# Patient Record
Sex: FEMALE | Race: WHITE | NOT HISPANIC OR LATINO | URBAN - METROPOLITAN AREA
[De-identification: names, ages, dates, MRNs, and addresses within clinical notes are randomized per-mention and may not be internally consistent; named-entity substitution may affect disease eponyms.]

---

## 2017-02-15 ENCOUNTER — INPATIENT (INPATIENT)
Facility: HOSPITAL | Age: 77
LOS: 4 days | Discharge: NURSING, REHAB OR LONG-TERM CARE | DRG: 871 | End: 2017-02-20
Attending: FAMILY MEDICINE | Admitting: INTERNAL MEDICINE
Payer: MEDICARE

## 2017-02-15 VITALS
OXYGEN SATURATION: 85 % | SYSTOLIC BLOOD PRESSURE: 82 MMHG | TEMPERATURE: 102 F | RESPIRATION RATE: 20 BRPM | HEART RATE: 91 BPM | DIASTOLIC BLOOD PRESSURE: 60 MMHG

## 2017-02-15 DIAGNOSIS — Z95.1 PRESENCE OF AORTOCORONARY BYPASS GRAFT: Chronic | ICD-10-CM

## 2017-02-15 DIAGNOSIS — A41.9 SEPSIS, UNSPECIFIED ORGANISM: ICD-10-CM

## 2017-02-15 DIAGNOSIS — Z95.2 PRESENCE OF PROSTHETIC HEART VALVE: Chronic | ICD-10-CM

## 2017-02-15 DIAGNOSIS — Z98.890 OTHER SPECIFIED POSTPROCEDURAL STATES: Chronic | ICD-10-CM

## 2017-02-15 LAB
ALBUMIN SERPL ELPH-MCNC: 2.6 G/DL — LOW (ref 3.3–5.2)
ALP SERPL-CCNC: 83 U/L — SIGNIFICANT CHANGE UP (ref 40–120)
ALT FLD-CCNC: 22 U/L — SIGNIFICANT CHANGE UP
ANION GAP SERPL CALC-SCNC: 11 MMOL/L — SIGNIFICANT CHANGE UP (ref 5–17)
ANION GAP SERPL CALC-SCNC: 9 MMOL/L — SIGNIFICANT CHANGE UP (ref 5–17)
ANISOCYTOSIS BLD QL: SLIGHT — SIGNIFICANT CHANGE UP
APPEARANCE UR: ABNORMAL
APTT BLD: 32.8 SEC — SIGNIFICANT CHANGE UP (ref 27.5–37.4)
AST SERPL-CCNC: 30 U/L — SIGNIFICANT CHANGE UP
BASOPHILS # BLD AUTO: 0 K/UL — SIGNIFICANT CHANGE UP (ref 0–0.2)
BASOPHILS NFR BLD AUTO: 0.1 % — SIGNIFICANT CHANGE UP (ref 0–2)
BILIRUB SERPL-MCNC: 0.3 MG/DL — LOW (ref 0.4–2)
BILIRUB UR-MCNC: NEGATIVE — SIGNIFICANT CHANGE UP
BUN SERPL-MCNC: 19 MG/DL — SIGNIFICANT CHANGE UP (ref 8–20)
BUN SERPL-MCNC: 19 MG/DL — SIGNIFICANT CHANGE UP (ref 8–20)
CALCIUM SERPL-MCNC: 8.3 MG/DL — LOW (ref 8.6–10.2)
CALCIUM SERPL-MCNC: 9 MG/DL — SIGNIFICANT CHANGE UP (ref 8.6–10.2)
CHLORIDE SERPL-SCNC: 90 MMOL/L — LOW (ref 98–107)
CHLORIDE SERPL-SCNC: 94 MMOL/L — LOW (ref 98–107)
CO2 SERPL-SCNC: 24 MMOL/L — SIGNIFICANT CHANGE UP (ref 22–29)
CO2 SERPL-SCNC: 28 MMOL/L — SIGNIFICANT CHANGE UP (ref 22–29)
COLOR SPEC: YELLOW — SIGNIFICANT CHANGE UP
CREAT SERPL-MCNC: 0.84 MG/DL — SIGNIFICANT CHANGE UP (ref 0.5–1.3)
CREAT SERPL-MCNC: 0.92 MG/DL — SIGNIFICANT CHANGE UP (ref 0.5–1.3)
DIFF PNL FLD: ABNORMAL
DIGOXIN SERPL-MCNC: 0.8 NG/ML — SIGNIFICANT CHANGE UP (ref 0.8–2)
EOSINOPHIL # BLD AUTO: 0 K/UL — SIGNIFICANT CHANGE UP (ref 0–0.5)
EOSINOPHIL NFR BLD AUTO: 0.1 % — SIGNIFICANT CHANGE UP (ref 0–6)
GLUCOSE SERPL-MCNC: 109 MG/DL — SIGNIFICANT CHANGE UP (ref 70–115)
GLUCOSE SERPL-MCNC: 90 MG/DL — SIGNIFICANT CHANGE UP (ref 70–115)
GLUCOSE UR QL: NEGATIVE MG/DL — SIGNIFICANT CHANGE UP
HCT VFR BLD CALC: 26.5 % — LOW (ref 37–47)
HGB BLD-MCNC: 8.1 G/DL — LOW (ref 12–16)
HYPOCHROMIA BLD QL: SLIGHT — SIGNIFICANT CHANGE UP
INR BLD: 1.34 RATIO — HIGH (ref 0.88–1.16)
KETONES UR-MCNC: ABNORMAL
LACTATE BLDV-MCNC: 1.1 MMOL/L — SIGNIFICANT CHANGE UP (ref 0.7–2)
LEUKOCYTE ESTERASE UR-ACNC: ABNORMAL
LYMPHOCYTES # BLD AUTO: 0.5 K/UL — LOW (ref 1–4.8)
LYMPHOCYTES # BLD AUTO: 3 % — LOW (ref 20–55)
MACROCYTES BLD QL: SLIGHT — SIGNIFICANT CHANGE UP
MCHC RBC-ENTMCNC: 28 PG — SIGNIFICANT CHANGE UP (ref 27–31)
MCHC RBC-ENTMCNC: 30.6 G/DL — LOW (ref 32–36)
MCV RBC AUTO: 91.7 FL — SIGNIFICANT CHANGE UP (ref 81–99)
MICROCYTES BLD QL: SLIGHT — SIGNIFICANT CHANGE UP
MONOCYTES # BLD AUTO: 0.8 K/UL — SIGNIFICANT CHANGE UP (ref 0–0.8)
MONOCYTES NFR BLD AUTO: 5 % — SIGNIFICANT CHANGE UP (ref 3–10)
NEUTROPHILS # BLD AUTO: 15.1 K/UL — HIGH (ref 1.8–8)
NEUTROPHILS NFR BLD AUTO: 92 % — HIGH (ref 37–73)
NITRITE UR-MCNC: POSITIVE
NT-PROBNP SERPL-SCNC: 5465 PG/ML — HIGH (ref 0–300)
PH UR: 5 — SIGNIFICANT CHANGE UP (ref 4.8–8)
PLAT MORPH BLD: NORMAL — SIGNIFICANT CHANGE UP
PLATELET # BLD AUTO: 357 K/UL — SIGNIFICANT CHANGE UP (ref 150–400)
POIKILOCYTOSIS BLD QL AUTO: SLIGHT — SIGNIFICANT CHANGE UP
POTASSIUM SERPL-MCNC: 2.9 MMOL/L — CRITICAL LOW (ref 3.5–5.3)
POTASSIUM SERPL-MCNC: 5.3 MMOL/L — SIGNIFICANT CHANGE UP (ref 3.5–5.3)
POTASSIUM SERPL-MCNC: 7 MMOL/L — CRITICAL HIGH (ref 3.5–5.3)
POTASSIUM SERPL-SCNC: 2.9 MMOL/L — CRITICAL LOW (ref 3.5–5.3)
POTASSIUM SERPL-SCNC: 5.3 MMOL/L — SIGNIFICANT CHANGE UP (ref 3.5–5.3)
POTASSIUM SERPL-SCNC: 7 MMOL/L — CRITICAL HIGH (ref 3.5–5.3)
PROT SERPL-MCNC: 7.9 G/DL — SIGNIFICANT CHANGE UP (ref 6.6–8.7)
PROT UR-MCNC: 30 MG/DL
PROTHROM AB SERPL-ACNC: 14.8 SEC — HIGH (ref 10–13.1)
RBC # BLD: 2.89 M/UL — LOW (ref 4.4–5.2)
RBC # FLD: 16.4 % — HIGH (ref 11–15.6)
RBC BLD AUTO: ABNORMAL
SODIUM SERPL-SCNC: 127 MMOL/L — LOW (ref 135–145)
SODIUM SERPL-SCNC: 129 MMOL/L — LOW (ref 135–145)
SP GR SPEC: 1.02 — SIGNIFICANT CHANGE UP (ref 1.01–1.02)
UROBILINOGEN FLD QL: 1 MG/DL
WBC # BLD: 16.4 K/UL — HIGH (ref 4.8–10.8)
WBC # FLD AUTO: 16.4 K/UL — HIGH (ref 4.8–10.8)

## 2017-02-15 PROCEDURE — 71010: CPT | Mod: 26

## 2017-02-15 PROCEDURE — 71250 CT THORAX DX C-: CPT | Mod: 26

## 2017-02-15 PROCEDURE — 99291 CRITICAL CARE FIRST HOUR: CPT

## 2017-02-15 PROCEDURE — 99223 1ST HOSP IP/OBS HIGH 75: CPT

## 2017-02-15 PROCEDURE — 99285 EMERGENCY DEPT VISIT HI MDM: CPT

## 2017-02-15 PROCEDURE — 71275 CT ANGIOGRAPHY CHEST: CPT | Mod: 26,59

## 2017-02-15 PROCEDURE — 99232 SBSQ HOSP IP/OBS MODERATE 35: CPT

## 2017-02-15 RX ORDER — LEVOTHYROXINE SODIUM 125 MCG
112 TABLET ORAL DAILY
Qty: 0 | Refills: 0 | Status: DISCONTINUED | OUTPATIENT
Start: 2017-02-15 | End: 2017-02-20

## 2017-02-15 RX ORDER — MIRTAZAPINE 45 MG/1
7.5 TABLET, ORALLY DISINTEGRATING ORAL AT BEDTIME
Qty: 0 | Refills: 0 | Status: DISCONTINUED | OUTPATIENT
Start: 2017-02-15 | End: 2017-02-16

## 2017-02-15 RX ORDER — ASPIRIN/CALCIUM CARB/MAGNESIUM 324 MG
81 TABLET ORAL DAILY
Qty: 0 | Refills: 0 | Status: DISCONTINUED | OUTPATIENT
Start: 2017-02-15 | End: 2017-02-15

## 2017-02-15 RX ORDER — POTASSIUM CHLORIDE 20 MEQ
10 PACKET (EA) ORAL
Qty: 0 | Refills: 0 | Status: COMPLETED | OUTPATIENT
Start: 2017-02-15 | End: 2017-02-15

## 2017-02-15 RX ORDER — BUDESONIDE AND FORMOTEROL FUMARATE DIHYDRATE 160; 4.5 UG/1; UG/1
2 AEROSOL RESPIRATORY (INHALATION)
Qty: 0 | Refills: 0 | Status: DISCONTINUED | OUTPATIENT
Start: 2017-02-15 | End: 2017-02-20

## 2017-02-15 RX ORDER — SODIUM CHLORIDE 9 MG/ML
1000 INJECTION INTRAMUSCULAR; INTRAVENOUS; SUBCUTANEOUS
Qty: 0 | Refills: 0 | Status: DISCONTINUED | OUTPATIENT
Start: 2017-02-15 | End: 2017-02-18

## 2017-02-15 RX ORDER — ENOXAPARIN SODIUM 100 MG/ML
40 INJECTION SUBCUTANEOUS DAILY
Qty: 0 | Refills: 0 | Status: DISCONTINUED | OUTPATIENT
Start: 2017-02-15 | End: 2017-02-15

## 2017-02-15 RX ORDER — DIGOXIN 250 MCG
0.12 TABLET ORAL DAILY
Qty: 0 | Refills: 0 | Status: DISCONTINUED | OUTPATIENT
Start: 2017-02-15 | End: 2017-02-20

## 2017-02-15 RX ORDER — ACETAMINOPHEN 500 MG
650 TABLET ORAL EVERY 6 HOURS
Qty: 0 | Refills: 0 | Status: DISCONTINUED | OUTPATIENT
Start: 2017-02-15 | End: 2017-02-20

## 2017-02-15 RX ORDER — SODIUM CHLORIDE 9 MG/ML
2000 INJECTION INTRAMUSCULAR; INTRAVENOUS; SUBCUTANEOUS ONCE
Qty: 0 | Refills: 0 | Status: COMPLETED | OUTPATIENT
Start: 2017-02-15 | End: 2017-02-15

## 2017-02-15 RX ORDER — ESMOLOL HCL 100MG/10ML
35 VIAL (ML) INTRAVENOUS ONCE
Qty: 0 | Refills: 0 | Status: DISCONTINUED | OUTPATIENT
Start: 2017-02-15 | End: 2017-02-15

## 2017-02-15 RX ORDER — ATORVASTATIN CALCIUM 80 MG/1
10 TABLET, FILM COATED ORAL AT BEDTIME
Qty: 0 | Refills: 0 | Status: DISCONTINUED | OUTPATIENT
Start: 2017-02-15 | End: 2017-02-20

## 2017-02-15 RX ORDER — ACETAMINOPHEN 500 MG
650 TABLET ORAL ONCE
Qty: 0 | Refills: 0 | Status: COMPLETED | OUTPATIENT
Start: 2017-02-15 | End: 2017-02-15

## 2017-02-15 RX ORDER — ACETAMINOPHEN 500 MG
650 TABLET ORAL ONCE
Qty: 0 | Refills: 0 | Status: DISCONTINUED | OUTPATIENT
Start: 2017-02-15 | End: 2017-02-15

## 2017-02-15 RX ORDER — GABAPENTIN 400 MG/1
100 CAPSULE ORAL
Qty: 0 | Refills: 0 | Status: DISCONTINUED | OUTPATIENT
Start: 2017-02-15 | End: 2017-02-20

## 2017-02-15 RX ORDER — ERTAPENEM SODIUM 1 G/1
1000 INJECTION, POWDER, LYOPHILIZED, FOR SOLUTION INTRAMUSCULAR; INTRAVENOUS EVERY 24 HOURS
Qty: 0 | Refills: 0 | Status: DISCONTINUED | OUTPATIENT
Start: 2017-02-15 | End: 2017-02-18

## 2017-02-15 RX ORDER — HYDROMORPHONE HYDROCHLORIDE 2 MG/ML
2 INJECTION INTRAMUSCULAR; INTRAVENOUS; SUBCUTANEOUS EVERY 6 HOURS
Qty: 0 | Refills: 0 | Status: DISCONTINUED | OUTPATIENT
Start: 2017-02-15 | End: 2017-02-16

## 2017-02-15 RX ORDER — CARVEDILOL PHOSPHATE 80 MG/1
3.12 CAPSULE, EXTENDED RELEASE ORAL EVERY 12 HOURS
Qty: 0 | Refills: 0 | Status: DISCONTINUED | OUTPATIENT
Start: 2017-02-15 | End: 2017-02-17

## 2017-02-15 RX ORDER — MEROPENEM 1 G/30ML
1000 INJECTION INTRAVENOUS ONCE
Qty: 0 | Refills: 0 | Status: COMPLETED | OUTPATIENT
Start: 2017-02-15 | End: 2017-02-15

## 2017-02-15 RX ORDER — POTASSIUM CHLORIDE 20 MEQ
40 PACKET (EA) ORAL ONCE
Qty: 0 | Refills: 0 | Status: COMPLETED | OUTPATIENT
Start: 2017-02-15 | End: 2017-02-15

## 2017-02-15 RX ADMIN — MEROPENEM 200 MILLIGRAM(S): 1 INJECTION INTRAVENOUS at 07:44

## 2017-02-15 RX ADMIN — ERTAPENEM SODIUM 120 MILLIGRAM(S): 1 INJECTION, POWDER, LYOPHILIZED, FOR SOLUTION INTRAMUSCULAR; INTRAVENOUS at 23:43

## 2017-02-15 RX ADMIN — HYDROMORPHONE HYDROCHLORIDE 2 MILLIGRAM(S): 2 INJECTION INTRAMUSCULAR; INTRAVENOUS; SUBCUTANEOUS at 23:40

## 2017-02-15 RX ADMIN — Medication 100 MILLIEQUIVALENT(S): at 12:11

## 2017-02-15 RX ADMIN — Medication 100 MILLIEQUIVALENT(S): at 14:12

## 2017-02-15 RX ADMIN — SODIUM CHLORIDE 2000 MILLILITER(S): 9 INJECTION INTRAMUSCULAR; INTRAVENOUS; SUBCUTANEOUS at 08:15

## 2017-02-15 RX ADMIN — Medication 100 MILLIEQUIVALENT(S): at 13:05

## 2017-02-15 RX ADMIN — Medication 40 MILLIEQUIVALENT(S): at 13:04

## 2017-02-15 RX ADMIN — CARVEDILOL PHOSPHATE 3.12 MILLIGRAM(S): 80 CAPSULE, EXTENDED RELEASE ORAL at 18:36

## 2017-02-15 RX ADMIN — GABAPENTIN 100 MILLIGRAM(S): 400 CAPSULE ORAL at 19:03

## 2017-02-15 RX ADMIN — SODIUM CHLORIDE 75 MILLILITER(S): 9 INJECTION INTRAMUSCULAR; INTRAVENOUS; SUBCUTANEOUS at 23:05

## 2017-02-15 RX ADMIN — SODIUM CHLORIDE 75 MILLILITER(S): 9 INJECTION INTRAMUSCULAR; INTRAVENOUS; SUBCUTANEOUS at 15:57

## 2017-02-15 RX ADMIN — MIRTAZAPINE 7.5 MILLIGRAM(S): 45 TABLET, ORALLY DISINTEGRATING ORAL at 23:05

## 2017-02-15 RX ADMIN — ATORVASTATIN CALCIUM 10 MILLIGRAM(S): 80 TABLET, FILM COATED ORAL at 23:05

## 2017-02-15 RX ADMIN — Medication 650 MILLIGRAM(S): at 07:04

## 2017-02-15 RX ADMIN — Medication 650 MILLIGRAM(S): at 08:06

## 2017-02-15 NOTE — CONSULT NOTE ADULT - SUBJECTIVE AND OBJECTIVE BOX
History of Present Illness:  76y Female reportedly found down and now admit with urosepsis?  CXR found to have incidental small Rt PTX.  Comfortable from a resp standpoint.    Past Medical History  Hypothyroidism, unspecified type  Coronary artery disease involving native coronary artery of native heart without angina pectoris  CHF (congestive heart failure)  Hypotension      Past Surgical History  S/P AVR  S/P CABG x 1  S/P AAA repair        MEDICATIONS  (STANDING):  sodium chloride 0.9%. 1000milliLiter(s) IV Continuous <Continuous>  enoxaparin Injectable 40milliGRAM(s) SubCutaneous daily  aspirin enteric coated 81milliGRAM(s) Oral daily  digoxin     Tablet 0.125milliGRAM(s) Oral daily  gabapentin 100milliGRAM(s) Oral two times a day  mirtazapine 7.5milliGRAM(s) Oral at bedtime  atorvastatin 10milliGRAM(s) Oral at bedtime  carvedilol 3.125milliGRAM(s) Oral every 12 hours  buDESOnide 160 MICROgram(s)/formoterol 4.5 MICROgram(s) Inhaler 2Puff(s) Inhalation two times a day  levothyroxine 112MICROGram(s) Oral daily  ertapenem  IVPB 1000milliGRAM(s) IV Intermittent every 24 hours    MEDICATIONS  (PRN):  acetaminophen   Tablet. 650milliGRAM(s) Oral every 6 hours PRN Moderate Pain (4 - 6)  HYDROmorphone   Tablet 2milliGRAM(s) Oral every 6 hours PRN Severe Pain (7 - 10)    Antiplatelet therapy:                           Last dose/amt:    Allergies: Codeine Phosphate (Other)  ferrous sulfate (Other)  penicillin (Other)      SOCIAL HISTORY:  Smoker: [ ] Yes  [x ] No        PACK YEARS:                         WHEN QUIT?  ETOH use: [ ] Yes  [x ] No              FREQUENCY / QUANTITY:  Ilicit Drug use:  [ ] Yes  [x ] No  Occupation:  Live with:  Assist device use:    Relevant Family History  FAMILY HISTORY:  No pertinent family history in first degree relatives      Review of Systems negative except for HPI.  Pt somewhat confused and history difficult  GENERAL:  Fevers[] chills[] sweats[] fatigue[] weight loss[] weight gain []                                        NEURO:  parathesias[] seizures []  syncope []  confusion []                                                                                  EYES: glasses[]  blurry vision[]  discharge[] pain[] glaucoma []                                                                            ENMT:  difficulty hearing []  vertigo[]  dysphagia[] epistaxis[] recent dental work []                                      CV:  chest pain[] palpitations[] DOLNA [] diaphoresis [] edema[]                                                                                             RESPIRATORY:  wheezing[] SOB[] cough [] sputum[] hemoptysis[]                                                                    GI:  nausea[]  vomiting []  diarrhea[] constipation [] melena []                                                                        : hematuria[ ]  dysuria[ ] urgency[] incontinence[]                                                                                              MUSKULOSKELETAL:  arthritis[ ]  joint swelling [ ] muscle weakness [ ]                                                                  SKIN/BREAST:  rash[ ] itching [ ]  hair loss[ ] masses[ ]                                                                                                PSYCH:  dementia [ ] depresion [ ] anxiety[ ]                                                                                                                  HEME/LYMPH:  bruises easily[ ] enlarged lymph nodes[ ] tender lymph nodes[ ]                                                 ENDOCRINE:  cold intolerance[ ] heat intolerance[ ] polydipsia[ ]                                                                              PHYSICAL EXAM  Vital Signs Last 24 Hrs  T(C): 36.4, Max: 38.9 (02-15 @ 06:39)  T(F): 97.6, Max: 102 (02-15 @ 06:39)  HR: 81 (81 - 101)  BP: 108/53 (82/60 - 108/53)  BP(mean): --  RR: 19 (19 - 22)  SpO2: 94% (85% - 100%)    General: Well nourished, well developed, no acute distress.                                                         Neuro: Normal exam oriented to person/place & time with no focal motor or sensory  deficits.                    Eyes: Normal exam of conjunctiva & lids, pupils equally reactive.   ENT: Normal exam of nasal/oral mucosa with absence of cyanosis.   Neck: Normal exam of jugular veins, trachea & thyroid.   Chest: Normal lung exam with good air movement absence of wheezes, rales, or rhonchi:                                                                          CV:  Auscultation: normal [x ] S3[ ] S4[ ] Irregular [ ] Rub[ ] Clicks[ ]  Murmurs none:[x ]systolic [ ]  diastolic [ ] holosystolic [ ]  Carotids: No Bruits[ ] Other____________ Abdominal Aorta: normal [ ] nonpalpable[ ]                                                                         GI: Normal exam of abdomen, liver & spleen with no noted masses or tenderness.                                                                                              Extremities: Normal no evidence of cyanosis or deformity Edema: none[ ]trace[ ]1+[ ]2+[ ]3+[ ]4+[ ]  Lower Extremity Pulses: Right[ ] Left[ ]Varicosities[ ]  SKIN : Normal exam to inspection & palation.                                                           LABS:                        8.1    16.4  )-----------( 357      ( 15 Feb 2017 07:18 )             26.5     15 Feb 2017 10:52    x      |  x      |  x      ----------------------------<  x      2.9     |  x      |  x        Ca    9.0        15 Feb 2017 07:18    TPro  7.9    /  Alb  2.6    /  TBili  0.3    /  DBili  x      /  AST  30     /  ALT  22     /  AlkPhos  83     15 Feb 2017 07:18    PT/INR - ( 15 Feb 2017 07:18 )   PT: 14.8 sec;   INR: 1.34 ratio         PTT - ( 15 Feb 2017 07:18 )  PTT:32.8 sec  Urinalysis Basic - ( 15 Feb 2017 10:15 )    Color: Yellow / Appearance: Slightly Turbid / S.020 / pH: x  Gluc: x / Ketone: Trace  / Bili: Negative / Urobili: 1 mg/dL   Blood: x / Protein: 30 mg/dL / Nitrite: Positive   Leuk Esterase: Moderate / RBC: 0-2 /HPF / WBC 26-50   Sq Epi: x / Non Sq Epi: x / Bacteria: Moderate      CARDIAC MARKERS ( 15 Feb 2017 07:18 )  x     / <0.01 ng/mL / x     / x     / x              CXR: small Rt PTX  CT Chest: p        Assessment:  76y Female presents with Sepsis, due to unspecified organism and incidentally found to have Rt PTX.  CT pending but comfortable from resp standpoint.  Suspect observation appropriate but will await CT.      Plan:

## 2017-02-15 NOTE — ED PROVIDER NOTE - PROGRESS NOTE DETAILS
Dr. Ellis was called and made aware. CT surgery called and made aware.  Multiple attempts made to reach Dr. Loyola including over head page - no response.

## 2017-02-15 NOTE — H&P ADULT. - ASSESSMENT
76 y F hx HTN, CAD s/p CABG, AAA s/p repair, s/p bioprosth AVR, pleural effusion s/p drainage, hypothyroidism adm w lethargy, sepsis, PNA/UTI.     Sepsis: PNA/UTI- sepsis protocol, IV abx, f/u cultures. BP improved, cont to monitor  r/o pneumothorax: thoracic surgery eval, f/u ct chest  CAD: cont cardiac meds as tolerated  hypothyroidism: cont synthroid  CHF: cont home meds, hold lasix for now. check dig level    Prophyl: lovenox 76 y F hx HTN, CAD s/p CABG, AAA s/p repair, s/p bioprosth AVR, pleural effusion s/p drainage, hypothyroidism adm w lethargy, sepsis, PNA/UTI.     Sepsis: PNA/UTI- sepsis protocol, IV abx, f/u cultures. BP improved, cont to monitor  r/o pneumothorax: thoracic surgery eval, f/u ct chest  CAD: cont cardiac meds as tolerated  hypothyroidism: cont synthroid  CHF: cont home meds, hold lasix for now. check dig level  Sacral decub: wound care consult, pain control    Prophyl: lovenox 76 y F hx HTN, CAD s/p CABG, AAA s/p repair, s/p bioprosth AVR, pleural effusion s/p drainage, hypothyroidism adm w lethargy, sepsis, PNA/UTI.     Sepsis: PNA/UTI- sepsis protocol, IV abx, f/u cultures. BP improved, cont to monitor  r/o pneumothorax: thoracic surgery eval, f/u ct chest  hypokalemia: repleted, f/u labs  CAD: cont cardiac meds as tolerated  hypothyroidism: cont synthroid  CHF: cont home meds, hold lasix for now. check dig level  Sacral decub: wound care consult, pain control    Prophyl: lovenox

## 2017-02-15 NOTE — ED PROVIDER NOTE - CARE PLAN
Principal Discharge DX:	Sepsis, due to unspecified organism  Secondary Diagnosis:	Hypokalemia  Secondary Diagnosis:	Hypoxia

## 2017-02-15 NOTE — ED ADULT TRIAGE NOTE - CHIEF COMPLAINT QUOTE
pt dx with pneumonia [facility] hypotensive upon arrival ems reports pt saturating 89% on nc  at NH  and open eyes to verbal stimuli  not speaking , pt arrives wet cough rales lower lobes as per ems. pt alert and oriented to self hot to touch pt dx with pneumonia from [facility]  of [facility] hypotensive upon arrival ems reports pt saturating 89% on nc  at NH  and open eyes to verbal stimuli  not speaking , pt arrives wet cough rales lower lobes as per ems. pt alert and oriented to self hot to touch, md called to bedside

## 2017-02-15 NOTE — CONSULT NOTE ADULT - ASSESSMENT
75 y/o female with multiple comorbidities, consult placed for possible aortic dissection type B  - Recommend CT angio chest abdomen pelvis  - Vascular surgery will follow

## 2017-02-15 NOTE — H&P ADULT. - RADIOLOGY RESULTS AND INTERPRETATION
CXR visualized: Impression:  Cardiomegaly. Tortuous aorta. Cannot exclude aneurysm. Small right   pneumothorax.    These critical values were discussed by Dr. Dada Altamirano with Dr. Lashon Sena on 2/15/2017 2:27 PM with read back..

## 2017-02-15 NOTE — ED ADULT NURSE NOTE - CHIEF COMPLAINT QUOTE
pt dx with pneumonia from [facility]  [facility] hypotensive upon arrival ems reports pt saturating 89% on nc  at NH  and open eyes to verbal stimuli  not speaking , pt arrives wet cough rales lower lobes as per ems. pt alert and oriented to self hot to touch, md called to bedside

## 2017-02-15 NOTE — ED PROVIDER NOTE - OBJECTIVE STATEMENT
This patient is a 76 year old woman who presents to the ED from [facility] after being found slow to respond, low pulse ox and with bilateral congestion.  Patient orders including Abx done prior to my evaluation by previous ER doctor.  Patient states that she has a wet cough that she has been unable to expectorate.  She also reports that she is currently being treated for PNA.  Patient occasionally has SOB.  She denies chest pain, abdominal pain, vomiting, and leg swelling.

## 2017-02-15 NOTE — H&P ADULT. - HISTORY OF PRESENT ILLNESS
76 y F hx COPD, CHF, hypothyroidism, sacral decub, AAA s/p repair, CAD s/p 1V CABG, bioprosthetic AVR Dec 2016, subsequently developed pleural effusion requiring drainage, transferred to [facility] wks ago, now presents following episode of unresponsiveness, lethargy. recently started on oral antibiotics for PNA. +congested cough, SOB. Denies F/C, CP, N/V/D, dysuria, abd pain, LE edema. Fair appetite. c/o pain from decub which is longstanding.

## 2017-02-15 NOTE — ED ADULT NURSE NOTE - OBJECTIVE STATEMENT
patient arrived via Ambulance SOB, on O2, rales and diminished BS noted, from NH, noted desating to 80s, SOB, + cough no prodution

## 2017-02-15 NOTE — CONSULT NOTE ADULT - SUBJECTIVE AND OBJECTIVE BOX
75 y/o female with multiple medical comorbidities, recently discharged from [facility] after cardiac procedure in december and aneurysm repair in july. Admitted to medicine with diagnosis of sepsis. Vascular surgery consulted for concern of aortic dissection. Pt states her main complaint right now is that she is having a hard time breathing. Admits to dry, non-productive cough and fever. States she has been having chest and back pain that has come and gone over the past few days. R arm BP = 158/70. L arm /76. Lower extremities non edematous with 2+ DP pulses. 77 y/o female with multiple medical comorbidities, recently discharged from [facility] after cardiac procedure in december and aneurysm repair in july. Admitted to medicine with diagnosis of sepsis. Vascular surgery consulted for concern of aortic dissection. Pt states her main complaint right now is that she is having a hard time breathing. Admits to dry, non-productive cough and fever. States she has been having chest and back pain that has come and gone over the past few days. States chest and back pain are worse when she coughs, denies alleviating factors. Admits to pain "deep" in chest as well as around old sternotomy incision.       MEDICATIONS  (STANDING):  sodium chloride 0.9%. 1000milliLiter(s) IV Continuous <Continuous>  digoxin     Tablet 0.125milliGRAM(s) Oral daily  gabapentin 100milliGRAM(s) Oral two times a day  mirtazapine 7.5milliGRAM(s) Oral at bedtime  atorvastatin 10milliGRAM(s) Oral at bedtime  carvedilol 3.125milliGRAM(s) Oral every 12 hours  buDESOnide 160 MICROgram(s)/formoterol 4.5 MICROgram(s) Inhaler 2Puff(s) Inhalation two times a day  levothyroxine 112MICROGram(s) Oral daily  ertapenem  IVPB 1000milliGRAM(s) IV Intermittent every 24 hours    MEDICATIONS  (PRN):  acetaminophen   Tablet. 650milliGRAM(s) Oral every 6 hours PRN Moderate Pain (4 - 6)  HYDROmorphone   Tablet 2milliGRAM(s) Oral every 6 hours PRN Severe Pain (7 - 10)      Vital Signs Last 24 Hrs  T(C): 36.4, Max: 38.9 (02-15 @ 06:39)  T(F): 97.6, Max: 102 (02-15 @ 06:39)  HR: 81 (81 - 101)  BP: 108/53 (82/60 - 108/53)  RR: 19 (19 - 22)  SpO2: 94% (85% - 100%)  R arm BP = 158/70 mmHg  L arm /76 mmHg    Constitutional: patient resting comfortably in bed, in no acute distress  Respiratory: no accessory muscle use, no conversational dyspnea, + dry cough appreciated on exam  Cardiovascular: Regular rate & rhythm. +healing sternotomy scar with reproducible tenderness to chest wall  Gastrointestinal: Abdomen soft, non-tender, non-distended, no rebound tenderness / guarding  Extremities: No peripheral edema of lower extremities b/l, no cyanosis, clubbing of lower extremities b/l  Vascular: 2+ DP/PT pulses bilaterally  Neurological: A&O x 3; no gross sensory / motor / coordination deficits  Psychiatric: Normal mood, normal affect  Skin: Warm & dry, no rashes      I&O's Detail    I & Os for current day (as of 2017 02:10)  =============================================  IN:    Sodium Chloride 0.9% IV Bolus: 2000 ml    IV PiggyBack: 100 ml    Total IN: 2100 ml  ---------------------------------------------  OUT:    Voided: 375 ml    Total OUT: 375 ml  ---------------------------------------------  Total NET: 1725 ml      LABS:                        8.1    16.4  )-----------( 357      ( 15 Feb 2017 07:18 )             26.5     15 Feb 2017 19:27    127    |  94     |  19.0   ----------------------------<  109    5.3     |  24.0   |  0.84     Ca    8.3        15 Feb 2017 19:27    TPro  7.9    /  Alb  2.6    /  TBili  0.3    /  DBili  x      /  AST  30     /  ALT  22     /  AlkPhos  83     15 Feb 2017 07:18    PT/INR - ( 15 Feb 2017 07:18 )   PT: 14.8 sec;   INR: 1.34 ratio         PTT - ( 15 Feb 2017 07:18 )  PTT:32.8 sec  Urinalysis Basic - ( 15 Feb 2017 10:15 )    Color: Yellow / Appearance: Slightly Turbid / S.020 / pH: x  Gluc: x / Ketone: Trace  / Bili: Negative / Urobili: 1 mg/dL   Blood: x / Protein: 30 mg/dL / Nitrite: Positive   Leuk Esterase: Moderate / RBC: 0-2 /HPF / WBC 26-50   Sq Epi: x / Non Sq Epi: x / Bacteria: Moderate

## 2017-02-15 NOTE — H&P ADULT. - PMH
CHF (congestive heart failure)    Coronary artery disease involving native coronary artery of native heart without angina pectoris    Hypotension    Hypothyroidism, unspecified type

## 2017-02-16 DIAGNOSIS — I50.9 HEART FAILURE, UNSPECIFIED: ICD-10-CM

## 2017-02-16 DIAGNOSIS — I95.9 HYPOTENSION, UNSPECIFIED: ICD-10-CM

## 2017-02-16 DIAGNOSIS — A41.9 SEPSIS, UNSPECIFIED ORGANISM: ICD-10-CM

## 2017-02-16 DIAGNOSIS — I25.10 ATHEROSCLEROTIC HEART DISEASE OF NATIVE CORONARY ARTERY WITHOUT ANGINA PECTORIS: ICD-10-CM

## 2017-02-16 LAB
ABO RH CONFIRMATION: SIGNIFICANT CHANGE UP
ALLERGY+IMMUNOLOGY DIAG STUDY NOTE: SIGNIFICANT CHANGE UP
ANION GAP SERPL CALC-SCNC: 11 MMOL/L — SIGNIFICANT CHANGE UP (ref 5–17)
ANISOCYTOSIS BLD QL: SLIGHT — SIGNIFICANT CHANGE UP
APTT BLD: 25.9 SEC — LOW (ref 27.5–37.4)
BASOPHILS NFR BLD AUTO: 1 % — SIGNIFICANT CHANGE UP (ref 0–2)
BLD GP AB SCN SERPL QL: ABNORMAL
BUN SERPL-MCNC: 15 MG/DL — SIGNIFICANT CHANGE UP (ref 8–20)
CALCIUM SERPL-MCNC: 8.1 MG/DL — LOW (ref 8.6–10.2)
CHLORIDE SERPL-SCNC: 96 MMOL/L — LOW (ref 98–107)
CO2 SERPL-SCNC: 22 MMOL/L — SIGNIFICANT CHANGE UP (ref 22–29)
CREAT SERPL-MCNC: 0.6 MG/DL — SIGNIFICANT CHANGE UP (ref 0.5–1.3)
CULTURE RESULTS: SIGNIFICANT CHANGE UP
D DIMER BLD IA.RAPID-MCNC: 3757 NG/ML DDU — HIGH
DIR ANTIGLOB POLYSPECIFIC INTERPRETATION: SIGNIFICANT CHANGE UP
EOSINOPHIL NFR BLD AUTO: 2 % — SIGNIFICANT CHANGE UP (ref 0–5)
GAS PNL BLDA: SIGNIFICANT CHANGE UP
GLUCOSE SERPL-MCNC: 90 MG/DL — SIGNIFICANT CHANGE UP (ref 70–115)
HCT VFR BLD CALC: 22.7 % — LOW (ref 37–47)
HCT VFR BLD CALC: 24.7 % — LOW (ref 37–47)
HCT VFR BLD CALC: 25.1 % — LOW (ref 37–47)
HGB BLD-MCNC: 7.3 G/DL — LOW (ref 12–16)
HGB BLD-MCNC: 7.9 G/DL — LOW (ref 12–16)
HGB BLD-MCNC: 7.9 G/DL — LOW (ref 12–16)
HYPOCHROMIA BLD QL: SLIGHT — SIGNIFICANT CHANGE UP
INR BLD: 1.37 RATIO — HIGH (ref 0.88–1.16)
LACTATE SERPL-SCNC: 1.1 MMOL/L — SIGNIFICANT CHANGE UP (ref 0.5–2)
LYMPHOCYTES # BLD AUTO: 1 % — LOW (ref 20–55)
MACROCYTES BLD QL: SLIGHT — SIGNIFICANT CHANGE UP
MCHC RBC-ENTMCNC: 27.6 PG — SIGNIFICANT CHANGE UP (ref 27–31)
MCHC RBC-ENTMCNC: 28.2 PG — SIGNIFICANT CHANGE UP (ref 27–31)
MCHC RBC-ENTMCNC: 28.2 PG — SIGNIFICANT CHANGE UP (ref 27–31)
MCHC RBC-ENTMCNC: 31.5 G/DL — LOW (ref 32–36)
MCHC RBC-ENTMCNC: 32 G/DL — SIGNIFICANT CHANGE UP (ref 32–36)
MCHC RBC-ENTMCNC: 32.2 G/DL — SIGNIFICANT CHANGE UP (ref 32–36)
MCV RBC AUTO: 86.4 FL — SIGNIFICANT CHANGE UP (ref 81–99)
MCV RBC AUTO: 87.6 FL — SIGNIFICANT CHANGE UP (ref 81–99)
MCV RBC AUTO: 89.6 FL — SIGNIFICANT CHANGE UP (ref 81–99)
MICROCYTES BLD QL: SLIGHT — SIGNIFICANT CHANGE UP
MONOCYTES NFR BLD AUTO: 2 % — LOW (ref 3–10)
NEUTROPHILS NFR BLD AUTO: 93 % — HIGH (ref 37–73)
PLAT MORPH BLD: NORMAL — SIGNIFICANT CHANGE UP
PLATELET # BLD AUTO: 269 K/UL — SIGNIFICANT CHANGE UP (ref 150–400)
PLATELET # BLD AUTO: 310 K/UL — SIGNIFICANT CHANGE UP (ref 150–400)
PLATELET # BLD AUTO: 371 K/UL — SIGNIFICANT CHANGE UP (ref 150–400)
POIKILOCYTOSIS BLD QL AUTO: SLIGHT — SIGNIFICANT CHANGE UP
POTASSIUM SERPL-MCNC: 4.8 MMOL/L — SIGNIFICANT CHANGE UP (ref 3.5–5.3)
POTASSIUM SERPL-SCNC: 4.8 MMOL/L — SIGNIFICANT CHANGE UP (ref 3.5–5.3)
PROCALCITONIN SERPL-MCNC: 0.7 NG/ML — HIGH (ref 0–0.5)
PROTHROM AB SERPL-ACNC: 15.1 SEC — HIGH (ref 10–13.1)
RBC # BLD: 2.59 M/UL — LOW (ref 4.4–5.2)
RBC # BLD: 2.8 M/UL — LOW (ref 4.4–5.2)
RBC # BLD: 2.86 M/UL — LOW (ref 4.4–5.2)
RBC # FLD: 16 % — HIGH (ref 11–15.6)
RBC # FLD: 16.1 % — HIGH (ref 11–15.6)
RBC # FLD: 16.6 % — HIGH (ref 11–15.6)
RBC BLD AUTO: ABNORMAL
SODIUM SERPL-SCNC: 129 MMOL/L — LOW (ref 135–145)
SPECIMEN SOURCE: SIGNIFICANT CHANGE UP
TYPE + AB SCN PNL BLD: SIGNIFICANT CHANGE UP
VARIANT LYMPHS # BLD: 1 % — SIGNIFICANT CHANGE UP (ref 0–6)
WBC # BLD: 10.8 K/UL — SIGNIFICANT CHANGE UP (ref 4.8–10.8)
WBC # BLD: 13.7 K/UL — HIGH (ref 4.8–10.8)
WBC # BLD: 13.7 K/UL — HIGH (ref 4.8–10.8)
WBC # FLD AUTO: 10.8 K/UL — SIGNIFICANT CHANGE UP (ref 4.8–10.8)
WBC # FLD AUTO: 13.7 K/UL — HIGH (ref 4.8–10.8)
WBC # FLD AUTO: 13.7 K/UL — HIGH (ref 4.8–10.8)

## 2017-02-16 PROCEDURE — 93010 ELECTROCARDIOGRAM REPORT: CPT

## 2017-02-16 PROCEDURE — 99291 CRITICAL CARE FIRST HOUR: CPT

## 2017-02-16 RX ORDER — NICARDIPINE HYDROCHLORIDE 30 MG/1
5 CAPSULE, EXTENDED RELEASE ORAL
Qty: 40 | Refills: 0 | Status: DISCONTINUED | OUTPATIENT
Start: 2017-02-16 | End: 2017-02-17

## 2017-02-16 RX ORDER — SODIUM CHLORIDE 9 MG/ML
3 INJECTION INTRAMUSCULAR; INTRAVENOUS; SUBCUTANEOUS EVERY 8 HOURS
Qty: 0 | Refills: 0 | Status: DISCONTINUED | OUTPATIENT
Start: 2017-02-16 | End: 2017-02-20

## 2017-02-16 RX ORDER — SODIUM CHLORIDE 9 MG/ML
1000 INJECTION INTRAMUSCULAR; INTRAVENOUS; SUBCUTANEOUS ONCE
Qty: 0 | Refills: 0 | Status: COMPLETED | OUTPATIENT
Start: 2017-02-16 | End: 2017-02-16

## 2017-02-16 RX ORDER — FAMOTIDINE 10 MG/ML
20 INJECTION INTRAVENOUS
Qty: 0 | Refills: 0 | Status: DISCONTINUED | OUTPATIENT
Start: 2017-02-16 | End: 2017-02-17

## 2017-02-16 RX ORDER — DOCUSATE SODIUM 100 MG
100 CAPSULE ORAL THREE TIMES A DAY
Qty: 0 | Refills: 0 | Status: DISCONTINUED | OUTPATIENT
Start: 2017-02-16 | End: 2017-02-20

## 2017-02-16 RX ORDER — SODIUM CHLORIDE 9 MG/ML
250 INJECTION INTRAMUSCULAR; INTRAVENOUS; SUBCUTANEOUS ONCE
Qty: 0 | Refills: 0 | Status: COMPLETED | OUTPATIENT
Start: 2017-02-16 | End: 2017-02-16

## 2017-02-16 RX ADMIN — Medication 112 MICROGRAM(S): at 07:15

## 2017-02-16 RX ADMIN — FAMOTIDINE 20 MILLIGRAM(S): 10 INJECTION INTRAVENOUS at 18:30

## 2017-02-16 RX ADMIN — CARVEDILOL PHOSPHATE 3.12 MILLIGRAM(S): 80 CAPSULE, EXTENDED RELEASE ORAL at 07:15

## 2017-02-16 RX ADMIN — SODIUM CHLORIDE 75 MILLILITER(S): 9 INJECTION INTRAMUSCULAR; INTRAVENOUS; SUBCUTANEOUS at 11:10

## 2017-02-16 RX ADMIN — SODIUM CHLORIDE 3 MILLILITER(S): 9 INJECTION INTRAMUSCULAR; INTRAVENOUS; SUBCUTANEOUS at 14:00

## 2017-02-16 RX ADMIN — GABAPENTIN 100 MILLIGRAM(S): 400 CAPSULE ORAL at 07:15

## 2017-02-16 RX ADMIN — SODIUM CHLORIDE 1000 MILLILITER(S): 9 INJECTION INTRAMUSCULAR; INTRAVENOUS; SUBCUTANEOUS at 14:00

## 2017-02-16 RX ADMIN — Medication 650 MILLIGRAM(S): at 22:46

## 2017-02-16 RX ADMIN — GABAPENTIN 100 MILLIGRAM(S): 400 CAPSULE ORAL at 18:30

## 2017-02-16 RX ADMIN — NICARDIPINE HYDROCHLORIDE 25 MG/HR: 30 CAPSULE, EXTENDED RELEASE ORAL at 22:46

## 2017-02-16 RX ADMIN — SODIUM CHLORIDE 75 MILLILITER(S): 9 INJECTION INTRAMUSCULAR; INTRAVENOUS; SUBCUTANEOUS at 18:30

## 2017-02-16 RX ADMIN — SODIUM CHLORIDE 75 MILLILITER(S): 9 INJECTION INTRAMUSCULAR; INTRAVENOUS; SUBCUTANEOUS at 07:15

## 2017-02-16 RX ADMIN — SODIUM CHLORIDE 2000 MILLILITER(S): 9 INJECTION INTRAMUSCULAR; INTRAVENOUS; SUBCUTANEOUS at 11:10

## 2017-02-16 RX ADMIN — HYDROMORPHONE HYDROCHLORIDE 2 MILLIGRAM(S): 2 INJECTION INTRAMUSCULAR; INTRAVENOUS; SUBCUTANEOUS at 08:32

## 2017-02-16 RX ADMIN — Medication 650 MILLIGRAM(S): at 23:45

## 2017-02-16 RX ADMIN — Medication 100 MILLIGRAM(S): at 22:47

## 2017-02-16 RX ADMIN — ATORVASTATIN CALCIUM 10 MILLIGRAM(S): 80 TABLET, FILM COATED ORAL at 22:46

## 2017-02-16 RX ADMIN — HYDROMORPHONE HYDROCHLORIDE 2 MILLIGRAM(S): 2 INJECTION INTRAMUSCULAR; INTRAVENOUS; SUBCUTANEOUS at 00:51

## 2017-02-16 RX ADMIN — BUDESONIDE AND FORMOTEROL FUMARATE DIHYDRATE 2 PUFF(S): 160; 4.5 AEROSOL RESPIRATORY (INHALATION) at 19:47

## 2017-02-16 RX ADMIN — Medication 0.12 MILLIGRAM(S): at 07:15

## 2017-02-16 RX ADMIN — BUDESONIDE AND FORMOTEROL FUMARATE DIHYDRATE 2 PUFF(S): 160; 4.5 AEROSOL RESPIRATORY (INHALATION) at 08:45

## 2017-02-16 RX ADMIN — SODIUM CHLORIDE 3 MILLILITER(S): 9 INJECTION INTRAMUSCULAR; INTRAVENOUS; SUBCUTANEOUS at 21:29

## 2017-02-16 RX ADMIN — ERTAPENEM SODIUM 120 MILLIGRAM(S): 1 INJECTION, POWDER, LYOPHILIZED, FOR SOLUTION INTRAMUSCULAR; INTRAVENOUS at 22:47

## 2017-02-16 NOTE — CHART NOTE - NSCHARTNOTEFT_GEN_A_CORE
RAPID RESPONSE NOTE    76 y F hx COPD, CHF, hypothyroidism, sacral decub, AAA s/p repair, CAD s/p 1V CABG, bioprosthetic AVR Dec 2016, aortic aneurysm repair 7/16, sent from rehab because of lethargy and unresponsiveness. CT chest showing aortic dissection 6cm. Rapid response called due to hypotension as per Nurse. IV fluids was immediately requested by Cardiologist at bedside and BP now trending up.       Vitals: BP = 109/66, HR = 90, R = 34, T= 99.3, O2 = 95% on   Gen: Lethargic, Lying in bed, not in any respiratory distress  Heent; NC/AT, lips appear dry, Neck supple  Resp: Bibasilar crackles noted  Cardio: S1/S2, systolic murmur noted, tenderness on palpation of chest, Midline vertical chest scar  Abd: Soft, Mild general abdominal discomfort, ND, +BS  Ext: no edema or cyanosis  Neuro: AAO x 3  Skin: Sacral decubital ulcer noted with surrounding erythema on bilateral buttocks      A/P: 75yo female with Sepsis secondary to UTI now with hypotension  --Likely septic shock but pt also with Loc Type B Aortic Dissection 6cm  --NS IV Bolus given with positive response and BP now 117/66.  --Continue Antibiotics for UTI  --TTE Echo ordered  --CT Surgery consult and ICU consulted by Cardio  --Spoke to ICU PA who states pt will be taken to CT ICU for further care  --Dr Ellis aware of plan.

## 2017-02-16 NOTE — DISCHARGE NOTE ADULT - SECONDARY DIAGNOSIS.
Coronary artery disease involving native coronary artery of native heart without angina pectoris Hypotension, unspecified hypotension type Sepsis, due to unspecified organism

## 2017-02-16 NOTE — DISCHARGE NOTE ADULT - PATIENT PORTAL LINK FT
“You can access the Follow[facility] Patient Portal, offered by [facility], by registering with the following website: [email or web address]”

## 2017-02-16 NOTE — DISCHARGE NOTE ADULT - CARE PROVIDERS DIRECT ADDRESSES
,DirectAddress_Unknown,DirectAddress_Unknown,DirectAddress_Unknown,[email or web address] ,DirectAddress_Unknown,DirectAddress_Unknown,[email or web address]

## 2017-02-16 NOTE — DISCHARGE NOTE ADULT - CARE PROVIDER_API CALL
Ivana,   Phone: (   )    -  Fax: (   )    -    Libia Nguyen (MARIKA), Cardiovascular Disease; Internal Medicine; Nuclear Cardiology  [address] [place] [address]  Phone: [phone]  Fax: [phone]    Cardiothoraciac Surgeon,   Phone: (   )    -  Fax: (   )    - Libia Nguyen (MARIKA), Cardiovascular Disease; Internal Medicine; Nuclear Cardiology  [address] [place] [address]  Phone: [phone]  Fax: [phone]    Ivana,   Phone: (   )    -  Fax: (   )    -    Víctor Ding), Surgery; Thoracic Surgery  [address] [place] [address]  Phone: [phone]  Fax: [phone]

## 2017-02-16 NOTE — DISCHARGE NOTE ADULT - CARE PLAN
Principal Discharge DX:	Abdominal aortic aneurysm (AAA) without rupture  Goal:	blood pressure control  Instructions for follow-up, activity and diet:	follow up with PMD and cardiothoracic surgery as well as cardiology  Secondary Diagnosis:	Coronary artery disease involving native coronary artery of native heart without angina pectoris  Secondary Diagnosis:	Hypotension, unspecified hypotension type  Secondary Diagnosis:	Sepsis, due to unspecified organism

## 2017-02-16 NOTE — DISCHARGE NOTE ADULT - ADDITIONAL INSTRUCTIONS
needs to follow up with cardiologist at [facility] for coumadin. Family made aware that patient fits a DYLAN score that warrants coumadin but because she is well known to the cardiac group at [facility] - will defer to them. Currently on ASA 81mg daily

## 2017-02-16 NOTE — DISCHARGE NOTE ADULT - PROVIDER TOKENS
FREE:[LAST:[Ivana],PHONE:[(   )    -],FAX:[(   )    -]],TOKEN:'[id number]',FREE:[LAST:[Cardiothoraciac Surgeon],PHONE:[(   )    -],FAX:[(   )    -]] TOKEN:'[id number]',FREE:[LAST:[Ivana],PHONE:[(   )    -],FAX:[(   )    -]],TOKEN:'[id number]'

## 2017-02-16 NOTE — PROGRESS NOTE ADULT - ASSESSMENT
75 yo [facility] COPD, CHF, hypothyroidism, sacral decub, AAA s/p repair 7/16, CAD s/p 1V CABG/bioprosthetic AVR/ ascending aneurysm repair 12/16, [facility] and unresponsiveness, noted to have sacral decubs and possible sepsis. CT chest showing aortic dissection extending to renal artery. Morning of 02/16 pt found to be diaphoretic, lethargic, pulses barely palpable, SBP arround 90 systolic, Gave IV fluid bolus, BP left arm came up to 105 systolic. Patient awake, lethagic. stat ECG showing new RBBB with junctional rhythm 75 yo [facility] COPD, CHF, hypothyroidism, sacral decub, AAA s/p repair 7/16, CAD s/p 1V CABG/bioprosthetic AVR/ ascending aneurysm repair 12/16, [facility] and [facility], noted to have sacral decubs and possible sepsis.  CT chest showing aortic dissection extending to renal artery. Morning of 02/16 pt found to be diaphoretic, Hypotensive with SBP 80-90s c, IV fluid bolus given BP improved to fluid bolus ECG showing new RBBB with junctional rhythm. Pt transferred to CT ICU for closer monitoring Pt presently being ruled out for Acute on Chronic Heart failure vs Sepsis. Pt transfuse 1 prbc for Hct 22.    Neuro: Mentating well, pain well controlled   Pulm: Wean from O2 as tolerated maintain SpO2% greater than 95%, encourage IS and deep breathing exercises, Daily CXR  CV: Hemodynamically stabl, HTN controlled with Cardene drip will titrate off as tolerated systolic goal 110-120, c/w Lipitor c/w correg/digoxin for Chronic CHF. Hold all antiplatelets until Hct stable for greater than 24hours.   GI: Tolerating PO, continue GI ppx, maintain bowel regimen  Endo: C/w home levothyroxine for hypothyroidism   : Maintain [place], strict I/O's, replete electrolytes prn, trend BUN/Cr   Heme/ID: H/H stable, Monitor for SIRS, trend WBC, continue periop abx coverage, F/u am CBC post transfusion.  Continue ICU care, Discussed patient with attending during ICU rounds 75 yo [facility] COPD, CHF, hypothyroidism, sacral decub, AAA s/p repair 7/16, CAD s/p 1V CABG/bioprosthetic AVR/ ascending aneurysm repair 12/16, sent from [facility] because of lethargy and unresponsiveness, noted to have sacral decubs and possible sepsis. Pt was being treated for PNA at rehab center.  CT chest showing aortic dissection extending to renal artery. Morning of 02/16 pt found to be diaphoretic, Hypotensive with SBP 80-90s c, IV fluid bolus given BP improved to fluid bolus ECG showing new RBBB with junctional rhythm. Pt transferred to CT ICU for closer monitoring Pt presently being ruled out for Acute on Chronic Heart failure vs Sepsis. Pt transfuse 1 prbc for Hct 22.    Neuro: Mentating well, pain well controlled   Pulm: Wean from O2 as tolerated maintain SpO2% greater than 95%, encourage IS and deep breathing exercises, Daily CXR  CV: Hemodynamically stable HTN controlled with Cardene drip will titrate off as tolerated systolic goal 110-120, c/w Lipitor c/w correg/digoxin for Chronic CHF. Hold all antiplatelets until Hct stable for greater than 24hours.   GI: NPO except meds, continue GI ppx with IV protonix 40 BID, maintain bowel regimen, Stool guiac test for all stools r/o GI bleed in setting of anemia.   Endo: C/w home levothyroxine for hypothyroidism   : Maintain [place], strict I/O's, replete electrolytes prn, trend BUN/Cr   Heme/ID: H/H stable, Monitor for SIRS, trend WBC, continue , F/u am CBC post transfusion. C/w ertapenem for PNA vs urosepsis coverage.   Continue ICU care, Discussed patient with attending during ICU rounds

## 2017-02-16 NOTE — DISCHARGE NOTE ADULT - MEDICATION SUMMARY - MEDICATIONS TO TAKE
I will START or STAY ON the medications listed below when I get home from the hospital:    Aspirin Enteric Coated 81 mg oral delayed release tablet  -- 1 tab(s) by mouth once a day  -- Indication: For AFib     digoxin 125 mcg (0.125 mg) oral tablet  -- 1 tab(s) by mouth once a day  -- Indication: For Afib    Neurontin 100 mg oral capsule  -- 1 tab(s) by mouth 2 times a day  -- Indication: For pain    Lipitor 10 mg oral tablet  -- 1 tab(s) by mouth once a day  -- Indication: For  hyperlipidemia    Ambien 5 mg oral tablet  -- 1 tab(s) by mouth once a day (at bedtime), As Needed  -- Indication: For Sleep    carvedilol 6.25 mg oral tablet  -- 1 tab(s) by mouth every 12 hours  -- Indication: For CHF (congestive heart failure)    Advair Diskus 250 mcg-50 mcg inhalation powder  -- 1 puff(s) inhaled 2 times a day  -- Indication: For CoPD    amLODIPine 10 mg oral tablet  -- 1 tab(s) by mouth once a day  -- Indication: For HTN    Lasix 20 mg oral tablet  -- 1 tab(s) by mouth once a day  -- Indication: For CHF (congestive heart failure)    furosemide 40 mg oral tablet  -- 1 tab(s) by mouth once a day for 3 days then continue with the lasix 20mg daily   -- Indication: For CHF (congestive heart failure)    docusate sodium 100 mg oral capsule  -- 1 cap(s) by mouth 2 times a day  -- Indication: For Constipation    potassium chloride 10 mEq oral capsule, extended release  -- 1 tab(s) by mouth once a day with lasix   -- Indication: For with lasix    Synthroid 112 mcg (0.112 mg) oral tablet  -- 1 tab(s) by mouth once a day  -- Indication: For Hypothyroidism, unspecified type

## 2017-02-16 NOTE — ED ADULT NURSE REASSESSMENT NOTE - NS ED NURSE REASSESS COMMENT FT1
Pt sleeping comfortably on stretcher, easy arousability, safety and comfort measures in place, awaiting bed assignment.
Report recvd from off going RN, pt recvd lying on stretcher, 3LO2 via NC, bed linens changed, viviana care provided, POC discussed, awaiting bed assignment, pt positioned for comfort, safety measures in place.
VS are stable, pt in stable condition. Report given to VINAY RN without incident.
pt lying on stretcher reports 9/10 back pain, orders initiated, repositioned for comfort, safety measures in place.
Rec'd patient more alert than when initially assessed, able to make needs known, family at bedside chest PT given for unproductive cough, pending further orders.

## 2017-02-16 NOTE — DISCHARGE NOTE ADULT - MEDICATION SUMMARY - MEDICATIONS TO STOP TAKING
I will STOP taking the medications listed below when I get home from the hospital:    mirtazapine 7.5 mg oral tablet  -- 1 tab(s) by mouth once a day (at bedtime)    Dilaudid 2 mg oral tablet  -- 1 tab(s) by mouth every 4 hours, As Needed    Megace  --  by mouth

## 2017-02-16 NOTE — PROGRESS NOTE ADULT - SUBJECTIVE AND OBJECTIVE BOX
Patient is a 76y old  Female who presents with a chief complaint of lethargy, pna   + UA abnormal and long standing decubs  afebrile today      ANTIMICROBIAL:  ertapenem  IVPB 1000milliGRAM(s) IV Intermittent every 24 hours    CARDIOVASCULAR:  digoxin     Tablet 0.125milliGRAM(s) Oral daily  carvedilol 3.125milliGRAM(s) Oral every 12 hours    PULMONARY:  buDESOnide 160 MICROgram(s)/formoterol 4.5 MICROgram(s) Inhaler 2Puff(s) Inhalation two times a day    NEUROLOGIC:  gabapentin 100milliGRAM(s) Oral two times a day  mirtazapine 7.5milliGRAM(s) Oral at bedtime  acetaminophen   Tablet. 650milliGRAM(s) Oral every 6 hours PRN  HYDROmorphone   Tablet 2milliGRAM(s) Oral every 6 hours PRN  :    ENDO/METABOLIC:  atorvastatin 10milliGRAM(s) Oral at bedtime  levothyroxine 112MICROGram(s) Oral daily    IV FLUID/NUTRITION:  sodium chloride 0.9%. 1000milliLiter(s) IV Continuous <Continuous>      Allergies    Codeine Phosphate (Other)  ferrous sulfate (Other)  penicillin (Other)      Vital Signs Last 24 Hrs  T(C): 36.7, Max: 36.7 (02-16 @ 03:55)  T(F): 98, Max: 98 (02-16 @ 03:55)  HR: 84 (81 - 87)  BP: 132/78 (108/53 - 151/77)  BP(mean): --  RR: 20 (19 - 20)  SpO2: 95% (94% - 96%)      REVIEW OF SYSTEMS:    CONSTITUTIONAL: No fever, weight loss, or fatigue  EYES: No eye pain, visual disturbances, or discharge  ENMT:  No difficulty hearing, tinnitus, vertigo; No sinus or throat pain  NECK: No pain or stiffness  RESPIRATORY: No cough, wheezing, chills or hemoptysis; No shortness of breath  CARDIOVASCULAR: No chest pain, palpitations, dizziness, or leg swelling  GASTROINTESTINAL: No abdominal or epigastric pain. No nausea, vomiting  GENITOURINARY: No dysuria, frequency, hematuria, or incontinence  NEUROLOGICAL: No headaches, memory loss, loss of strength, numbness, or tremors  SKIN: No itching, burning, rashes, or lesions         PHYSICAL EXAM:    General: ill looking   HEAD:  Atraumatic, Normocephalic  EYES: EOMI, PERRLA, conjunctiva and sclera clear  ENMT: No tonsillar erythema, exudates, or enlargement; Moist mucous membranes, Good dentition, No lesions  NECK: Supple, No JVD, Normal thyroid  NERVOUS SYSTEM:  Alert & Oriented - answers appropriately   CHEST/LUNG: Clear to percussion bilaterally; No rales, rhonchi, wheezing, or rubs  HEART: Regular rate and rhythm; +murmur   ABDOMEN: Soft, Nontender, Nondistended; Bowel sounds present  EXTREMITIES:  2+ Peripheral Pulses, No clubbing, cyanosis, or edema        LABS:                        8.1    16.4  )-----------( 357      ( 15 Feb 2017 07:18 )             26.5     CBC Full  -  ( 15 Feb 2017 07:18 )  WBC Count : 16.4 K/uL  Hemoglobin : 8.1 g/dL  Hematocrit : 26.5 %  Platelet Count - Automated : 357 K/uL  Mean Cell Volume : 91.7 fl  Mean Cell Hemoglobin : 28.0 pg  Mean Cell Hemoglobin Concentration : 30.6 g/dL  Auto Neutrophil # : 15.1 K/uL  Auto Lymphocyte # : 0.5 K/uL  Auto Monocyte # : 0.8 K/uL  Auto Eosinophil # : 0.0 K/uL  Auto Basophil # : 0.0 K/uL  Auto Neutrophil % : 92.0 %  Auto Lymphocyte % : 3.0 %  Auto Monocyte % : 5.0 %  Auto Eosinophil % : 0.1 %  Auto Basophil % : 0.1 %    15 Feb 2017 19:27    127    |  94     |  19.0   ----------------------------<  109    5.3     |  24.0   |  0.84     Ca    8.3        15 Feb 2017 19:27    TPro  7.9    /  Alb  2.6    /  TBili  0.3    /  DBili  x      /  AST  30     /  ALT  22     /  AlkPhos  83     15 Feb 2017 07:18    PT/INR - ( 15 Feb 2017 07:18 )   PT: 14.8 sec;   INR: 1.34 ratio         PTT - ( 15 Feb 2017 07:18 )  PTT:32.8 sec  Urinalysis Basic - ( 15 Feb 2017 10:15 )    Color: Yellow / Appearance: Slightly Turbid / S.020 / pH: x  Gluc: x / Ketone: Trace  / Bili: Negative / Urobili: 1 mg/dL   Blood: x / Protein: 30 mg/dL / Nitrite: Positive   Leuk Esterase: Moderate / RBC: 0-2 /HPF / WBC 26-50   Sq Epi: x / Non Sq Epi: x / Bacteria: Moderate          Serum Pro-Brain Natriuretic Peptide: 5465 pg/mL (02-15 @ 07:23)        LIVER FUNCTIONS - ( 15 Feb 2017 07:18 )  Alb: 2.6 g/dL / Pro: 7.9 g/dL / ALK PHOS: 83 U/L / ALT: 22 U/L / AST: 30 U/L / GGT: x             RADIOLOGY & ADDITIONAL TESTS:  CT Angio :   IMPRESSION:    Post ascending thoracic and abdominal aortic repair and aortic valve   replacement. Surgical material surrounding the ascending thoracic aorta   just above the aortic root and at the junction of the aortic arch.   Extravasation of contrast from the true lumen into the false lumen from   the surgical repair site involving the distal ascending thoracic aorta at  the junction of the aortic arch. High attenuation contrast seen   throughout the false lumen within the aortic arch, descending thoracic   aorta up to the proximal abdominal aorta. High density in the false lumen   at the level of the right renal artery takeoff which could be due to   second site of extravasation. Greatest luminal diameter of the ascending   thoracic aorta is 4 cm at the level of the proximal ascending thoracic   aortic repair, greatest luminal diameter of aortic arch includingthe   true and false lumen is 4.8 cm, and greatest luminal diameter of the   descending thoracic aorta including the true and false lumen is 3.9 cm.   Greatest diameter of the abdominal aorta at the level of the renal artery   takeoffs is 4.9 cm in AP dimension. Comparison with outside prior imaging   is necessary to assess interval change.    Small bilateral pleural effusions and bibasilar atelectasis.    Critical findings were discussed with ZACARIAS Coats at 11:20 PM on 2/15/2017.

## 2017-02-16 NOTE — CONSULT NOTE ADULT - SUBJECTIVE AND OBJECTIVE BOX
[facility], [facility]                                   [address] [place] [address]                                                      PHONE: [phone]                                                         FAX: [phone]  [email or web address]  ---------------------------------------------------------------------------------------------------------------------------------    76y Female with past medical history as under    PAST MEDICAL & SURGICAL HISTORY:  Hypothyroidism, unspecified type  Coronary artery disease involving native coronary artery of native heart without angina pectoris  CHF (congestive heart failure)  Hypotension  S/P AVR  S/P CABG x 1  S/P AAA repair  No significant past surgical history      Codeine Phosphate (Other)  ferrous sulfate (Other)  penicillin (Other)      MEDICATIONS  (STANDING):  sodium chloride 0.9%. 1000milliLiter(s) IV Continuous <Continuous>  digoxin     Tablet 0.125milliGRAM(s) Oral daily  gabapentin 100milliGRAM(s) Oral two times a day  mirtazapine 7.5milliGRAM(s) Oral at bedtime  atorvastatin 10milliGRAM(s) Oral at bedtime  carvedilol 3.125milliGRAM(s) Oral every 12 hours  buDESOnide 160 MICROgram(s)/formoterol 4.5 MICROgram(s) Inhaler 2Puff(s) Inhalation two times a day  levothyroxine 112MICROGram(s) Oral daily  ertapenem  IVPB 1000milliGRAM(s) IV Intermittent every 24 hours    MEDICATIONS  (PRN):  acetaminophen   Tablet. 650milliGRAM(s) Oral every 6 hours PRN Moderate Pain (4 - 6)  HYDROmorphone   Tablet 2milliGRAM(s) Oral every 6 hours PRN Severe Pain (7 - 10)      Social History:  No smoking   No alcohol  No     ROS: Negative other than as mentioned in HPI.    Vital Signs Last 24 Hrs  T(C): 36.7, Max: 36.7 ( @ 03:55)  T(F): 98, Max: 98 ( @ 03:55)  HR: 84 (81 - 87)  BP: 132/78 (108/53 - 151/77)  BP(mean): --  RR: 20 (19 - 20)  SpO2: 95% (94% - 96%)  ICU Vital Signs Last 24 Hrs  MAYNOR SHIV  I&O's Detail    I & Os for current day (as of 2017 09:55)  =============================================  IN:    Sodium Chloride 0.9% IV Bolus: 2000 ml    IV PiggyBack: 100 ml    Total IN: 2100 ml  ---------------------------------------------  OUT:    Voided: 375 ml    Total OUT: 375 ml  ---------------------------------------------  Total NET: 1725 ml    I&O's Summary    I & Os for current day (as of 2017 09:55)  =============================================  IN: 2100 ml / OUT: 375 ml / NET: 1725 ml    Drug Dosing Weight  MAYNOR CHANEY      PHYSICAL EXAM:    HEENT:  No JVD  CARDIOVASCULAR: Normal S1 and S2, No murmur, rub, lift.   LUNGS: No rales, rhonchi or wheeze. Normal breath sounds bilaterally.  ABDOMEN: Soft, nontender without mass or organomegaly. bowel sounds normoactive.  EXTREMITIES: No clubbing, cyanosis or edema          LABS:                        8.1    16.4  )-----------( 357      ( 15 Feb 2017 07:18 )             26.5     15 Feb 2017 19:27    127    |  94     |  19.0   ----------------------------<  109    5.3     |  24.0   |  0.84     Ca    8.3        15 Feb 2017 19:27    TPro  7.9    /  Alb  2.6    /  TBili  0.3    /  DBili  x      /  AST  30     /  ALT  22     /  AlkPhos  83     15 Feb 2017 07:18    MAYNOR CHANEY  CARDIAC MARKERS ( 15 Feb 2017 07:18 )  x     / <0.01 ng/mL / x     / x     / x          PT/INR - ( 15 Feb 2017 07:18 )   PT: 14.8 sec;   INR: 1.34 ratio         PTT - ( 15 Feb 2017 07:18 )  PTT:32.8 sec  Urinalysis Basic - ( 15 Feb 2017 10:15 )    Color: Yellow / Appearance: Slightly Turbid / S.020 / pH: x  Gluc: x / Ketone: Trace  / Bili: Negative / Urobili: 1 mg/dL   Blood: x / Protein: 30 mg/dL / Nitrite: Positive   Leuk Esterase: Moderate / RBC: 0-2 /HPF / WBC 26-50   Sq Epi: x / Non Sq Epi: x / Bacteria: Moderate        Assessment and Plan:  In summary, MAYNOR CHANEY is an 76y Female with past medical history significant for [facility], [facility]                                   [address] [place] [address]                                                      PHONE: [phone]                                                         FAX: [phone]  [email or web address]  ---------------------------------------------------------------------------------------------------------------------------------  76 y F hx COPD, CHF, hypothyroidism, sacral decub, AAA s/p repair, CAD s/p 1V CABG, bioprosthetic AVR Dec 2016, aortic aneurysm repair , sent from rehab because of lethargy and unresponsiveness, noted to have sacral decubs and possible sepsis. CT chest showing aortic dissection extending to renal artery.     PAST MEDICAL & SURGICAL HISTORY:  Hypothyroidism, unspecified type  Coronary artery disease involving native coronary artery of native heart without angina pectoris  CHF (congestive heart failure)  Hypotension  S/P AVR  S/P CABG x 1  S/P AAA repair  No significant past surgical history      Codeine Phosphate (Other)  ferrous sulfate (Other)  penicillin (Other)      MEDICATIONS  (STANDING):  sodium chloride 0.9%. 1000milliLiter(s) IV Continuous <Continuous>  digoxin     Tablet 0.125milliGRAM(s) Oral daily  gabapentin 100milliGRAM(s) Oral two times a day  mirtazapine 7.5milliGRAM(s) Oral at bedtime  atorvastatin 10milliGRAM(s) Oral at bedtime  carvedilol 3.125milliGRAM(s) Oral every 12 hours  buDESOnide 160 MICROgram(s)/formoterol 4.5 MICROgram(s) Inhaler 2Puff(s) Inhalation two times a day  levothyroxine 112MICROGram(s) Oral daily  ertapenem  IVPB 1000milliGRAM(s) IV Intermittent every 24 hours    MEDICATIONS  (PRN):  acetaminophen   Tablet. 650milliGRAM(s) Oral every 6 hours PRN Moderate Pain (4 - 6)  HYDROmorphone   Tablet 2milliGRAM(s) Oral every 6 hours PRN Severe Pain (7 - 10)      Social History:  No smoking   No alcohol  No     ROS: Negative other than as mentioned in HPI.    Vital Signs Last 24 Hrs  T(C): 36.7, Max: 36.7 ( @ 03:55)  T(F): 98, Max: 98 ( @ 03:55)  HR: 84 (81 - 87)  BP: 90 systolic  BP(mean): --  RR: 20 (19 - 20)  SpO2: 95% (94% - 96%)  ICU Vital Signs Last 24 Hrs  MAYNOR CHANEY  I&O's Detail    I & Os for current day (as of 2017 09:55)  =============================================  IN:    Sodium Chloride 0.9% IV Bolus: 2000 ml    IV PiggyBack: 100 ml    Total IN: 2100 ml  ---------------------------------------------  OUT:    Voided: 375 ml    Total OUT: 375 ml  ---------------------------------------------  Total NET: 1725 ml    I&O's Summary    I & Os for current day (as of 2017 09:55)  =============================================  IN: 2100 ml / OUT: 375 ml / NET: 1725 ml    Drug Dosing Weight  MAYNOR CHURCHILLLLI      PHYSICAL EXAM:    HEENT:  No JVD  CARDIOVASCULAR: Normal S1 and S2, No murmur, rub, lift.   LUNGS: No rales, rhonchi or wheeze. Normal breath sounds bilaterally.  ABDOMEN: Soft, nontender without mass or organomegaly. bowel sounds normoactive.  EXTREMITIES: No clubbing, cyanosis or edema          LABS:                        8.1    16.4  )-----------( 357      ( 15 Feb 2017 07:18 )             26.5     15 Feb 2017 19:27    127    |  94     |  19.0   ----------------------------<  109    5.3     |  24.0   |  0.84     Ca    8.3        15 Feb 2017 19:27    TPro  7.9    /  Alb  2.6    /  TBili  0.3    /  DBili  x      /  AST  30     /  ALT  22     /  AlkPhos  83     15 Feb 2017 07:18    NOREENE SHIV  CARDIAC MARKERS ( 15 Feb 2017 07:18 )  x     / <0.01 ng/mL / x     / x     / x          PT/INR - ( 15 Feb 2017 07:18 )   PT: 14.8 sec;   INR: 1.34 ratio         PTT - ( 15 Feb 2017 07:18 )  PTT:32.8 sec  Urinalysis Basic - ( 15 Feb 2017 10:15 )    Color: Yellow / Appearance: Slightly Turbid / S.020 / pH: x  Gluc: x / Ketone: Trace  / Bili: Negative / Urobili: 1 mg/dL   Blood: x / Protein: 30 mg/dL / Nitrite: Positive   Leuk Esterase: Moderate / RBC: 0-2 /HPF / WBC 26-50   Sq Epi: x / Non Sq Epi: x / Bacteria: Moderate        Assessment and Plan:  76 y F hx COPD, CHF, hypothyroidism, sacral decub, AAA s/p repair, CAD s/p 1V CABG, bioprosthetic AVR Dec 2016, aortic aneurysm repair , sent from rehab because of lethargy and unresponsiveness, noted to have sacral decubs and possible sepsis. CT chest showing aortic dissection extending to renal artery. walked in to the room, patient diaphoretic, lethargic, pulses barely palpable, SBP arround 90 systolic, Gave IV fluid bolus, BP left arm came up to 105 systolic. Patient awake, lethagic. stat ECG showing new RBBB with junctional rhythm  ?sepsis with shock: will check stat echo (called echo techs), will follow trops, IV abx panculture  aortic Dissection: CT surgery on board, Right arm colder than left arm with difference in BP,  D/w MICU attending and CT surgery PA about deterioration in status. Prognosis poor at present. No family member by bedside.

## 2017-02-16 NOTE — PROGRESS NOTE ADULT - SUBJECTIVE AND OBJECTIVE BOX
Subjective: Pt laying in bed normotensive, snow placed for UOP monitoring in cirtically ill pt, no complaints at this time.     T(C): 37.8, Max: 37.8 (02-16 @ 21:00)  HR: 84 (78 - 97)  BP: 126/63 (81/53 - 151/77)  ABP: 158/69 (116/51 - 158/69)  ABP(mean): 102 (74 - 102)  RR: 32 (16 - 32)  SpO2: 97% (93% - 100%)  Wt(kg): --  CVP(mm Hg): --  CO: --  CI: --  PA: --      16 Feb 2017 09:43    129    |  96     |  15.0   ----------------------------<  90     4.8     |  22.0   |  0.60     Ca    8.1        16 Feb 2017 09:43    TPro  7.9    /  Alb  2.6    /  TBili  0.3    /  DBili  x      /  AST  30     /  ALT  22     /  AlkPhos  83     15 Feb 2017 07:18                               7.9    13.7  )-----------( 371      ( 16 Feb 2017 10:58 )             25.1        PT/INR - ( 16 Feb 2017 14:19 )   PT: 15.1 sec;   INR: 1.37 ratio         PTT - ( 16 Feb 2017 14:19 )  PTT:25.9 sec                         CAPILLARY BLOOD GLUCOSE       CXR: AM CXR Pending     I&O's Detail  I & Os for 24h ending 16 Feb 2017 07:00  =============================================  IN:    Sodium Chloride 0.9% IV Bolus: 2000 ml    IV PiggyBack: 100 ml    Total IN: 2100 ml  ---------------------------------------------  OUT:    Voided: 375 ml    Total OUT: 375 ml  ---------------------------------------------  Total NET: 1725 ml    I & Os for current day (as of 16 Feb 2017 22:54)  =============================================  IN:    sodium chloride 0.9%.: 750 ml    Sodium Chloride 0.9% IV Bolus: 250 ml    Total IN: 1000 ml  ---------------------------------------------  OUT:    Total OUT: 0 ml  ---------------------------------------------  Total NET: 1000 ml    Drug Dosing Weight  Height (cm): 162.6 (16 Feb 2017 12:30)  Weight (kg): 70 (15 Feb 2017 07:06)  BMI (kg/m2): 26.5 (16 Feb 2017 12:30)  BSA (m2): 1.75 (16 Feb 2017 12:30)    MEDICATIONS  (STANDING):  sodium chloride 0.9%. 1000milliLiter(s) IV Continuous <Continuous>  digoxin     Tablet 0.125milliGRAM(s) Oral daily  gabapentin 100milliGRAM(s) Oral two times a day  atorvastatin 10milliGRAM(s) Oral at bedtime  carvedilol 3.125milliGRAM(s) Oral every 12 hours  buDESOnide 160 MICROgram(s)/formoterol 4.5 MICROgram(s) Inhaler 2Puff(s) Inhalation two times a day  levothyroxine 112MICROGram(s) Oral daily  ertapenem  IVPB 1000milliGRAM(s) IV Intermittent every 24 hours  sodium chloride 0.9% lock flush 3milliLiter(s) IV Push every 8 hours  famotidine    Tablet 20milliGRAM(s) Oral two times a day  docusate sodium 100milliGRAM(s) Oral three times a day  niCARdipine Infusion 5mG/Hr IV Continuous <Continuous>    MEDICATIONS  (PRN):  acetaminophen   Tablet. 650milliGRAM(s) Oral every 6 hours PRN Moderate Pain (4 - 6)        CTA  IMPRESSION:    Post ascending thoracic and abdominal aortic repair and aortic valve   replacement. Surgical material surrounding the ascending thoracic aorta   just above the aortic root and at the junction of the aortic arch.   Extravasation of contrast from the true lumen into the false lumen from   the surgical repair site involving the distal ascending thoracic aorta at  the junction of the aortic arch. High attenuation contrast seen   throughout the false lumen within the aortic arch, descending thoracic   aorta up to the proximal abdominal aorta. High density in the false lumen   at the level of the right renal artery takeoff which could be due to   second site of extravasation. Greatest luminal diameter of the ascending   thoracic aorta is 4 cm at the level of the proximal ascending thoracic   aortic repair, greatest luminal diameter of aortic arch includingthe   true and false lumen is 4.8 cm, and greatest luminal diameter of the   descending thoracic aorta including the true and false lumen is 3.9 cm.   Greatest diameter of the abdominal aorta at the level of the renal artery   takeoffs is 4.9 cm in AP dimension. Comparison with outside prior imaging   is necessary to assess interval change.    Small bilateral pleural effusions and bibasilar atelectasis.        Physical Exam  Neuro: AAOx3 in NAD  Pulm: crackles b/l bases   CV: RRR, positive S1/S2  Abd: NT/ND, positive bowel sounds  Extremities: DP 2+, 1+ pitting edema   Incision(s): sternal wound c/d/i Subjective: Pt laying in bed normotensive, snow placed for UOP monitoring in cirtically ill pt, no complaints at this time.     T(C): 37.8, Max: 37.8 (02-16 @ 21:00)  HR: 84 (78 - 97)  BP: 126/63 (81/53 - 151/77)  ABP: 158/69 (116/51 - 158/69)  ABP(mean): 102 (74 - 102)  RR: 32 (16 - 32)  SpO2: 97% (93% - 100%)  Wt(kg): --  CVP(mm Hg): --  CO: --  CI: --  PA: --      16 Feb 2017 09:43    129    |  96     |  15.0   ----------------------------<  90     4.8     |  22.0   |  0.60     Ca    8.1        16 Feb 2017 09:43    TPro  7.9    /  Alb  2.6    /  TBili  0.3    /  DBili  x      /  AST  30     /  ALT  22     /  AlkPhos  83     15 Feb 2017 07:18                               7.9    13.7  )-----------( 371      ( 16 Feb 2017 10:58 )             25.1        PT/INR - ( 16 Feb 2017 14:19 )   PT: 15.1 sec;   INR: 1.37 ratio         PTT - ( 16 Feb 2017 14:19 )  PTT:25.9 sec                         CAPILLARY BLOOD GLUCOSE       CXR: AM CXR Pending     I&O's Detail  I & Os for 24h ending 16 Feb 2017 07:00  =============================================  IN:    Sodium Chloride 0.9% IV Bolus: 2000 ml    IV PiggyBack: 100 ml    Total IN: 2100 ml  ---------------------------------------------  OUT:    Voided: 375 ml    Total OUT: 375 ml  ---------------------------------------------  Total NET: 1725 ml    I & Os for current day (as of 16 Feb 2017 22:54)  =============================================  IN:    sodium chloride 0.9%.: 750 ml    Sodium Chloride 0.9% IV Bolus: 250 ml    Total IN: 1000 ml  ---------------------------------------------  OUT:    Total OUT: 0 ml  ---------------------------------------------  Total NET: 1000 ml    Drug Dosing Weight  Height (cm): 162.6 (16 Feb 2017 12:30)  Weight (kg): 70 (15 Feb 2017 07:06)  BMI (kg/m2): 26.5 (16 Feb 2017 12:30)  BSA (m2): 1.75 (16 Feb 2017 12:30)    MEDICATIONS  (STANDING):  sodium chloride 0.9%. 1000milliLiter(s) IV Continuous <Continuous>  digoxin     Tablet 0.125milliGRAM(s) Oral daily  gabapentin 100milliGRAM(s) Oral two times a day  atorvastatin 10milliGRAM(s) Oral at bedtime  carvedilol 3.125milliGRAM(s) Oral every 12 hours  buDESOnide 160 MICROgram(s)/formoterol 4.5 MICROgram(s) Inhaler 2Puff(s) Inhalation two times a day  levothyroxine 112MICROGram(s) Oral daily  ertapenem  IVPB 1000milliGRAM(s) IV Intermittent every 24 hours  sodium chloride 0.9% lock flush 3milliLiter(s) IV Push every 8 hours  famotidine    Tablet 20milliGRAM(s) Oral two times a day  docusate sodium 100milliGRAM(s) Oral three times a day  niCARdipine Infusion 5mG/Hr IV Continuous <Continuous>    MEDICATIONS  (PRN):  acetaminophen   Tablet. 650milliGRAM(s) Oral every 6 hours PRN Moderate Pain (4 - 6)        CTA  IMPRESSION:    Post ascending thoracic and abdominal aortic repair and aortic valve   replacement. Surgical material surrounding the ascending thoracic aorta   just above the aortic root and at the junction of the aortic arch.   Extravasation of contrast from the true lumen into the false lumen from   the surgical repair site involving the distal ascending thoracic aorta at  the junction of the aortic arch. High attenuation contrast seen   throughout the false lumen within the aortic arch, descending thoracic   aorta up to the proximal abdominal aorta. High density in the false lumen   at the level of the right renal artery takeoff which could be due to   second site of extravasation. Greatest luminal diameter of the ascending   thoracic aorta is 4 cm at the level of the proximal ascending thoracic   aortic repair, greatest luminal diameter of aortic arch includingthe   true and false lumen is 4.8 cm, and greatest luminal diameter of the   descending thoracic aorta including the true and false lumen is 3.9 cm.   Greatest diameter of the abdominal aorta at the level of the renal artery   takeoffs is 4.9 cm in AP dimension. Comparison with outside prior imaging   is necessary to assess interval change.    Small bilateral pleural effusions and bibasilar atelectasis.            Physical Exam  Neuro: AAOx3 in NAD  Pulm: crackles b/l bases   CV: RRR, positive S1/S2  Abd: NT/ND, positive bowel sounds  Extremities: DP 2+, 1+ pitting edema   Incision(s): sternal wound c/d/i

## 2017-02-16 NOTE — DISCHARGE NOTE ADULT - HOSPITAL COURSE
76 y F hx COPD, CHF, hypothyroidism, sacral decub, AAA s/p repair, CAD s/p 1V CABG, bioprosthetic AVR Dec 2016, subsequently developed pleural effusion requiring drainage, transferred to rehab at [facility] 2 wks ago, now presents following episode of unresponsiveness, lethargy. recently started on oral antibiotics for PNA. +congested cough, SOB. Denies F/C, CP, N/V/D, dysuria, abd pain, LE edema. Fair appetite. c/o pain from decub which is longstanding. 76 y F hx COPD, CHF, hypothyroidism, sacral decub, AAA s/p repair, CAD s/p 1V CABG, bioprosthetic AVR Dec 2016, subsequently developed pleural effusion requiring drainage, transferred to rehab at [facility] 2 wks ago, now presents following episode of unresponsiveness, lethargy. recently started on oral antibiotics for PNA. +congested cough, SOB. Denies F/C, CP, N/V/D, dysuria, abd pain, LE edema. Fair appetite. c/o pain from decub which is longstanding.  During the hospitalization pt was acutely hypotensive with different pressures in the arms. + aortic dissection and CT surgery admitted pt under their service for intense BP control. Within 24 hrs pt was stable. 76 y F hx COPD, CHF, hypothyroidism, sacral decub, AAA s/p repair, CAD s/p 1V CABG, bioprosthetic AVR Dec 2016, subsequently developed pleural effusion requiring drainage, transferred to rehab at [facility] 2 wks ago, now presents following episode of unresponsiveness, lethargy. recently started on oral antibiotics for PNA. +congested cough, SOB. Denies F/C, CP, N/V/D, dysuria, abd pain, LE edema. Fair appetite. c/o pain from decub which is longstanding.  During the hospitalization pt was acutely hypotensive with different pressures in the arms. + aortic dissection and CT surgery admitted pt under their service for intense BP control. Within 24 hrs pt was stable and transferred to .  Family at bedside and aware regarding rate controlled Afib and need for coumadin. Advised that they need to follow up with cardio - at [facility] regarding why or why not coumadin. will continue ASA 81mg daily for now

## 2017-02-17 DIAGNOSIS — I71.4 ABDOMINAL AORTIC ANEURYSM, WITHOUT RUPTURE: ICD-10-CM

## 2017-02-17 DIAGNOSIS — E03.9 HYPOTHYROIDISM, UNSPECIFIED: ICD-10-CM

## 2017-02-17 DIAGNOSIS — Z41.8 ENCOUNTER FOR OTHER PROCEDURES FOR PURPOSES OTHER THAN REMEDYING HEALTH STATE: ICD-10-CM

## 2017-02-17 LAB
ANION GAP SERPL CALC-SCNC: 9 MMOL/L — SIGNIFICANT CHANGE UP (ref 5–17)
ANISOCYTOSIS BLD QL: SLIGHT — SIGNIFICANT CHANGE UP
BUN SERPL-MCNC: 13 MG/DL — SIGNIFICANT CHANGE UP (ref 8–20)
CALCIUM SERPL-MCNC: 8 MG/DL — LOW (ref 8.6–10.2)
CHLORIDE SERPL-SCNC: 102 MMOL/L — SIGNIFICANT CHANGE UP (ref 98–107)
CO2 SERPL-SCNC: 23 MMOL/L — SIGNIFICANT CHANGE UP (ref 22–29)
CREAT SERPL-MCNC: 0.48 MG/DL — LOW (ref 0.5–1.3)
EOSINOPHIL NFR BLD AUTO: 1 % — SIGNIFICANT CHANGE UP (ref 0–5)
GLUCOSE SERPL-MCNC: 88 MG/DL — SIGNIFICANT CHANGE UP (ref 70–115)
GRAM STN FLD: SIGNIFICANT CHANGE UP
GRAM STN FLD: SIGNIFICANT CHANGE UP
HCT VFR BLD CALC: 24.1 % — LOW (ref 37–47)
HCT VFR BLD CALC: 27.6 % — LOW (ref 37–47)
HGB BLD-MCNC: 7.8 G/DL — LOW (ref 12–16)
HGB BLD-MCNC: 9 G/DL — LOW (ref 12–16)
HYPOCHROMIA BLD QL: SLIGHT — SIGNIFICANT CHANGE UP
LYMPHOCYTES # BLD AUTO: 5 % — LOW (ref 20–55)
MACROCYTES BLD QL: SLIGHT — SIGNIFICANT CHANGE UP
MAGNESIUM SERPL-MCNC: 1.8 MG/DL — SIGNIFICANT CHANGE UP (ref 1.8–2.5)
MCHC RBC-ENTMCNC: 27.9 PG — SIGNIFICANT CHANGE UP (ref 27–31)
MCHC RBC-ENTMCNC: 28 PG — SIGNIFICANT CHANGE UP (ref 27–31)
MCHC RBC-ENTMCNC: 32.4 G/DL — SIGNIFICANT CHANGE UP (ref 32–36)
MCHC RBC-ENTMCNC: 32.6 G/DL — SIGNIFICANT CHANGE UP (ref 32–36)
MCV RBC AUTO: 85.7 FL — SIGNIFICANT CHANGE UP (ref 81–99)
MCV RBC AUTO: 86.1 FL — SIGNIFICANT CHANGE UP (ref 81–99)
MICROCYTES BLD QL: SLIGHT — SIGNIFICANT CHANGE UP
MONOCYTES NFR BLD AUTO: 8 % — SIGNIFICANT CHANGE UP (ref 3–10)
NEUTROPHILS NFR BLD AUTO: 85 % — HIGH (ref 37–73)
NEUTS BAND # BLD: 1 % — SIGNIFICANT CHANGE UP (ref 0–8)
OVALOCYTES BLD QL SMEAR: SLIGHT — SIGNIFICANT CHANGE UP
PHOSPHATE SERPL-MCNC: 2.7 MG/DL — SIGNIFICANT CHANGE UP (ref 2.4–4.7)
PLAT MORPH BLD: NORMAL — SIGNIFICANT CHANGE UP
PLATELET # BLD AUTO: 261 K/UL — SIGNIFICANT CHANGE UP (ref 150–400)
PLATELET # BLD AUTO: 287 K/UL — SIGNIFICANT CHANGE UP (ref 150–400)
POIKILOCYTOSIS BLD QL AUTO: SLIGHT — SIGNIFICANT CHANGE UP
POTASSIUM SERPL-MCNC: 4.4 MMOL/L — SIGNIFICANT CHANGE UP (ref 3.5–5.3)
POTASSIUM SERPL-SCNC: 4.4 MMOL/L — SIGNIFICANT CHANGE UP (ref 3.5–5.3)
RBC # BLD: 2.8 M/UL — LOW (ref 4.4–5.2)
RBC # BLD: 3.22 M/UL — LOW (ref 4.4–5.2)
RBC # FLD: 16.3 % — HIGH (ref 11–15.6)
RBC # FLD: 16.8 % — HIGH (ref 11–15.6)
RBC BLD AUTO: ABNORMAL
SODIUM SERPL-SCNC: 134 MMOL/L — LOW (ref 135–145)
SPECIMEN SOURCE: SIGNIFICANT CHANGE UP
WBC # BLD: 9.4 K/UL — SIGNIFICANT CHANGE UP (ref 4.8–10.8)
WBC # BLD: 9.8 K/UL — SIGNIFICANT CHANGE UP (ref 4.8–10.8)
WBC # FLD AUTO: 9.4 K/UL — SIGNIFICANT CHANGE UP (ref 4.8–10.8)
WBC # FLD AUTO: 9.8 K/UL — SIGNIFICANT CHANGE UP (ref 4.8–10.8)

## 2017-02-17 PROCEDURE — 93010 ELECTROCARDIOGRAM REPORT: CPT

## 2017-02-17 PROCEDURE — 71010: CPT | Mod: 26

## 2017-02-17 PROCEDURE — 99291 CRITICAL CARE FIRST HOUR: CPT

## 2017-02-17 RX ORDER — CARVEDILOL PHOSPHATE 80 MG/1
6.25 CAPSULE, EXTENDED RELEASE ORAL EVERY 12 HOURS
Qty: 0 | Refills: 0 | Status: DISCONTINUED | OUTPATIENT
Start: 2017-02-17 | End: 2017-02-20

## 2017-02-17 RX ORDER — AMLODIPINE BESYLATE 2.5 MG/1
5 TABLET ORAL DAILY
Qty: 0 | Refills: 0 | Status: DISCONTINUED | OUTPATIENT
Start: 2017-02-17 | End: 2017-02-17

## 2017-02-17 RX ORDER — MAGNESIUM SULFATE 500 MG/ML
2 VIAL (ML) INJECTION ONCE
Qty: 0 | Refills: 0 | Status: COMPLETED | OUTPATIENT
Start: 2017-02-17 | End: 2017-02-17

## 2017-02-17 RX ORDER — HYDRALAZINE HCL 50 MG
10 TABLET ORAL EVERY 6 HOURS
Qty: 0 | Refills: 0 | Status: DISCONTINUED | OUTPATIENT
Start: 2017-02-17 | End: 2017-02-20

## 2017-02-17 RX ORDER — HYDRALAZINE HCL 50 MG
5 TABLET ORAL ONCE
Qty: 0 | Refills: 0 | Status: COMPLETED | OUTPATIENT
Start: 2017-02-17 | End: 2017-02-17

## 2017-02-17 RX ORDER — LABETALOL HCL 100 MG
200 TABLET ORAL EVERY 8 HOURS
Qty: 0 | Refills: 0 | Status: DISCONTINUED | OUTPATIENT
Start: 2017-02-17 | End: 2017-02-17

## 2017-02-17 RX ORDER — AMLODIPINE BESYLATE 2.5 MG/1
10 TABLET ORAL DAILY
Qty: 0 | Refills: 0 | Status: DISCONTINUED | OUTPATIENT
Start: 2017-02-18 | End: 2017-02-20

## 2017-02-17 RX ORDER — PANTOPRAZOLE SODIUM 20 MG/1
40 TABLET, DELAYED RELEASE ORAL
Qty: 0 | Refills: 0 | Status: DISCONTINUED | OUTPATIENT
Start: 2017-02-17 | End: 2017-02-20

## 2017-02-17 RX ORDER — PANTOPRAZOLE SODIUM 20 MG/1
40 TABLET, DELAYED RELEASE ORAL EVERY 12 HOURS
Qty: 0 | Refills: 0 | Status: DISCONTINUED | OUTPATIENT
Start: 2017-02-17 | End: 2017-02-17

## 2017-02-17 RX ORDER — HYDRALAZINE HCL 50 MG
10 TABLET ORAL ONCE
Qty: 0 | Refills: 0 | Status: COMPLETED | OUTPATIENT
Start: 2017-02-17 | End: 2017-02-17

## 2017-02-17 RX ADMIN — ATORVASTATIN CALCIUM 10 MILLIGRAM(S): 80 TABLET, FILM COATED ORAL at 22:54

## 2017-02-17 RX ADMIN — ERTAPENEM SODIUM 120 MILLIGRAM(S): 1 INJECTION, POWDER, LYOPHILIZED, FOR SOLUTION INTRAMUSCULAR; INTRAVENOUS at 22:54

## 2017-02-17 RX ADMIN — Medication 0.12 MILLIGRAM(S): at 06:42

## 2017-02-17 RX ADMIN — SODIUM CHLORIDE 3 MILLILITER(S): 9 INJECTION INTRAMUSCULAR; INTRAVENOUS; SUBCUTANEOUS at 21:23

## 2017-02-17 RX ADMIN — Medication 10 MILLIGRAM(S): at 17:54

## 2017-02-17 RX ADMIN — GABAPENTIN 100 MILLIGRAM(S): 400 CAPSULE ORAL at 06:43

## 2017-02-17 RX ADMIN — PANTOPRAZOLE SODIUM 40 MILLIGRAM(S): 20 TABLET, DELAYED RELEASE ORAL at 00:11

## 2017-02-17 RX ADMIN — Medication 112 MICROGRAM(S): at 06:42

## 2017-02-17 RX ADMIN — BUDESONIDE AND FORMOTEROL FUMARATE DIHYDRATE 2 PUFF(S): 160; 4.5 AEROSOL RESPIRATORY (INHALATION) at 08:42

## 2017-02-17 RX ADMIN — Medication 100 MILLIGRAM(S): at 06:44

## 2017-02-17 RX ADMIN — Medication 5 MILLIGRAM(S): at 19:27

## 2017-02-17 RX ADMIN — CARVEDILOL PHOSPHATE 3.12 MILLIGRAM(S): 80 CAPSULE, EXTENDED RELEASE ORAL at 06:43

## 2017-02-17 RX ADMIN — BUDESONIDE AND FORMOTEROL FUMARATE DIHYDRATE 2 PUFF(S): 160; 4.5 AEROSOL RESPIRATORY (INHALATION) at 20:33

## 2017-02-17 RX ADMIN — SODIUM CHLORIDE 3 MILLILITER(S): 9 INJECTION INTRAMUSCULAR; INTRAVENOUS; SUBCUTANEOUS at 05:55

## 2017-02-17 RX ADMIN — Medication 100 MILLIGRAM(S): at 15:20

## 2017-02-17 RX ADMIN — AMLODIPINE BESYLATE 5 MILLIGRAM(S): 2.5 TABLET ORAL at 18:28

## 2017-02-17 RX ADMIN — CARVEDILOL PHOSPHATE 6.25 MILLIGRAM(S): 80 CAPSULE, EXTENDED RELEASE ORAL at 10:20

## 2017-02-17 RX ADMIN — Medication 50 GRAM(S): at 07:28

## 2017-02-17 RX ADMIN — Medication 100 MILLIGRAM(S): at 22:54

## 2017-02-17 RX ADMIN — GABAPENTIN 100 MILLIGRAM(S): 400 CAPSULE ORAL at 17:12

## 2017-02-17 RX ADMIN — SODIUM CHLORIDE 3 MILLILITER(S): 9 INJECTION INTRAMUSCULAR; INTRAVENOUS; SUBCUTANEOUS at 13:33

## 2017-02-17 NOTE — PROGRESS NOTE ADULT - ASSESSMENT
77 yo female with PMH COPD, CHF, hypothyroidism, sacral decub, AAA s/p repair 7/16, CAD s/p 1V CABG/bioprosthetic AVR/ ascending aneurysm repair 12/16, sent from [facility] because of lethargy and unresponsiveness, noted to have sacral decubs and possible sepsis. Pt was being treated for PNA at rehab center.    CT chest showing aortic dissection extending to renal artery. Rapid response on 2/16 for diaphoresis and hypotension in SBP of 80s to 90s.   Patient transferred to CT ICU for close monitor.

## 2017-02-17 NOTE — PROGRESS NOTE ADULT - SUBJECTIVE AND OBJECTIVE BOX
Pt hemodynamically stable. Pt to be medically managed/no indication for surgery at this time. Pt transferred to Dr Ellis's medical service as discussed with Dr Ding.

## 2017-02-17 NOTE — PROGRESS NOTE ADULT - SUBJECTIVE AND OBJECTIVE BOX
[facility]                                   [address] [place] [address]                                                      PHONE: [phone]                                                         FAX: [phone]  [email or web address]  ---------------------------------------------------------------------------------------------------------------------------------    Overnight events/patient complaints: awake alert, pt developed elevated BP overnight, still coughing.  [facility] chart reviewed.  s/p Bio AVR for AI, intraop noted ascending dissection which was repaired, EF 25% postop.  Transferred to rehab, there developed PNA and was being treated. Transferred here for decreased responsiveness and low BP.  A so has decubiti.  Hgb on discharge was 9.3      Codeine Phosphate (Other)  ferrous sulfate (Other)  penicillin (Other)    MEDICATIONS  (STANDING):  sodium chloride 0.9%. 1000milliLiter(s) IV Continuous <Continuous>  digoxin     Tablet 0.125milliGRAM(s) Oral daily  gabapentin 100milliGRAM(s) Oral two times a day  atorvastatin 10milliGRAM(s) Oral at bedtime  buDESOnide 160 MICROgram(s)/formoterol 4.5 MICROgram(s) Inhaler 2Puff(s) Inhalation two times a day  levothyroxine 112MICROGram(s) Oral daily  ertapenem  IVPB 1000milliGRAM(s) IV Intermittent every 24 hours  sodium chloride 0.9% lock flush 3milliLiter(s) IV Push every 8 hours  docusate sodium 100milliGRAM(s) Oral three times a day  niCARdipine Infusion 5mG/Hr IV Continuous <Continuous>  labetalol 200milliGRAM(s) Oral every 8 hours  pantoprazole    Tablet 40milliGRAM(s) Oral before breakfast    MEDICATIONS  (PRN):  acetaminophen   Tablet. 650milliGRAM(s) Oral every 6 hours PRN Moderate Pain (4 - 6)      Vital Signs Last 24 Hrs  T(C): 36.4, Max: 37.8 (-16 @ 21:00)  T(F): 97.5, Max: 100 (16 @ 21:00)  HR: 66 (66 - 97)  BP: 116/58 (81/53 - 130/64)  BP(mean): 82 (62 - 90)  RR: 20 (13 - 32)  SpO2: 92% (91% - 100%)  Daily Height in cm: 162.56 (2017 12:30)    Daily Weight in k.6 (2017 07:00)  ICU Vital Signs Last 24 Hrs  MAYNOR CHANEY  I&O's Detail    I & Os for current day (as of 2017 08:33)  =============================================  IN:    sodium chloride 0.9%.: 1500 ml    Sodium Chloride 0.9% IV Bolus: 250 ml    niCARdipine Infusion: 162.5 ml    IV PiggyBack: 50 ml    Solution: 50 ml    Total IN: 2012.5 ml  ---------------------------------------------  OUT:    Indwelling Catheter - Urethral: 915 ml    Total OUT: 915 ml  ---------------------------------------------  Total NET: 1097.5 ml    I&O's Summary    I & Os for current day (as of 2017 08:33)  =============================================  IN: 2012.5 ml / OUT: 915 ml / NET: 1097.5 ml    Drug Dosing Weight  MAYNOR CHANEY      PHYSICAL EXAM:  General: Appears well developed, well nourished alert and cooperative.  HEENT: Head; normocephalic, atraumatic.  Eyes: Pupils reactive, cornea wnl.  Neck: Supple, no nodes adenopathy, no NVD or carotid bruit or thyromegaly.  CARDIOVASCULAR: Normal S1 and S2, No murmur, rub, gallop or lift.   LUNGS: No rales, rhonchi or wheeze. Normal breath sounds bilaterally.  ABDOMEN: Soft, nontender without mass or organomegaly. bowel sounds normoactive.  EXTREMITIES: No clubbing, cyanosis or edema. Distal pulses wnl.   SKIN: warm and dry with normal turgor.  NEURO: Alert/oriented x 3/normal motor exam. No pathologic reflexes.    PSYCH: normal affect.        LABS:                        7.8    9.4   )-----------( 287      ( 2017 04:55 )             24.1     2017 04:55    134    |  102    |  13.0   ----------------------------<  88     4.4     |  23.0   |  0.48     Ca    8.0        2017 04:55  Phos  2.7       2017 04:55  Mg     1.8       2017 04:55      MAYNOR CHANEY      PT/INR - ( 2017 14:19 )   PT: 15.1 sec;   INR: 1.37 ratio         PTT - ( 2017 14:19 )  PTT:25.9 sec  Urinalysis Basic - ( 15 Feb 2017 10:15 )    Color: Yellow / Appearance: Slightly Turbid / S.020 / pH: x  Gluc: x / Ketone: Trace  / Bili: Negative / Urobili: 1 mg/dL   Blood: x / Protein: 30 mg/dL / Nitrite: Positive   Leuk Esterase: Moderate / RBC: 0-2 /HPF / WBC 26-50   Sq Epi: x / Non Sq Epi: x / Bacteria: Moderate        RADIOLOGY & ADDITIONAL STUDIES:  Impression:  Cardiomegaly. Tortuous aorta. Cannot exclude aneurysm. Small right   pneumothorax.    These critical values were discussed by Dr. Dada Altamirano with Dr. Lashon Sena on 2/15/2017 2:27 PM with read back..        INTERPRETATION OF TELEMETRY (personally reviewed): NSR    ECG:Diagnosis Line *** Poor data quality, interpretation may be adversely affected  Normal sinus rhythm  Nonspecific T wave abnormality  Abnormal ECG    Confirmed by JANNY HAYES ([id number]) on 2/15/2017 8:58:07 AM    ECHO: Summary:   1. Left ventricular ejection fraction, by visual estimation, is 40 to   45%.   2. Mildly to moderately decreased global left ventricular systolic   function.   3. Spectral Doppler shows impaired relaxation pattern of left   ventricular myocardial filling (Grade I diastolic dysfunction).   4. Normal left ventricular internal cavity size.   5. There is mild concentric left ventricular hypertrophy.   6. Normal right ventricular size and function.   7. The left atrium is normal in size.   8. The right atrium is normal in size.   9. Mild to moderate mitral annular calcification.  10. Thickening of the anterior and posterior mitral valve leaflets.  11. Mild mitral valve regurgitation.  12. Bioprosthesis in the aortic position demosntrating normal function.  13. Mild aortic regurgitation.  14. Prosthetic graft in the ascending aorta position. No evidence of leak   in limited views of the ascending aortic prosthesis.  15. Small pericardial effusion.     MD Jazmin Electronically signed on 2017 at 2:32:34 PM

## 2017-02-17 NOTE — PROGRESS NOTE ADULT - SUBJECTIVE AND OBJECTIVE BOX
CC: Lethargy    Patient seen and examined at bedside,   Patient with Type A AAA, Had Rapid response yesterday for hypotension and was transferred to CICU.  Patient was on volume resuscitated and is doing well. BP controlled via Cardene drip.   Patient not ambulating, NPO, + snow with good output and last BM 2 days prior .  Cardiac monitor reviewed;  ROS: Denies fever, chest pain, SOB, abdominal pain, diarrhea, constipation, calf pain. Patient has +non-productive cough      VS:   Vital Signs Last 24 Hrs  T(C): 36.4, Max: 37.8 (02-16 @ 21:00)  T(F): 97.5, Max: 100 (02-16 @ 21:00)  HR: 69 (69 - 97)  BP: 116/58 (81/53 - 132/78)  BP(mean): 82 (62 - 90)  RR: 19 (13 - 32)  SpO2: 96% (93% - 100%)    Physical Exam:   Gen: NAD, Comfortable laying in bed  HEENT: NCAT, PERRLA  Neck: No JVD  CVS: RRR, +s1/s2, no murmur appreciated  Lungs: decreased air entry bilaterally, no wheeze, rales or rhonchi.   Abdomen: +BS, soft, NT, ND  Ext, No cyanosis, edema or calf tenderness.    Labs:                        7.8    9.4   )-----------( 287      ( 17 Feb 2017 04:55 )             24.1   17 Feb 2017 04:55    134    |  102    |  13.0   ----------------------------<  88     4.4     |  23.0   |  0.48     Ca    8.0        17 Feb 2017 04:55  Phos  2.7       17 Feb 2017 04:55  Mg     1.8       17 Feb 2017 04:55    TPro  7.9    /  Alb  2.6    /  TBili  0.3    /  DBili  x      /  AST  30     /  ALT  22     /  AlkPhos  83     15 Feb 2017 07:18    I & Os for 24h ending 02-16 @ 07:00  =============================================  IN: 2100 ml / OUT: 375 ml / NET: 1725 ml    I & Os for current day (as of 02-17 @ 06:54)  =============================================  IN: 1852.5 ml / OUT: 715 ml / NET: 1137.5 ml      Medications:  MEDICATIONS  (STANDING):  sodium chloride 0.9%. 1000milliLiter(s) IV Continuous <Continuous>  digoxin     Tablet 0.125milliGRAM(s) Oral daily  gabapentin 100milliGRAM(s) Oral two times a day  atorvastatin 10milliGRAM(s) Oral at bedtime  carvedilol 3.125milliGRAM(s) Oral every 12 hours  buDESOnide 160 MICROgram(s)/formoterol 4.5 MICROgram(s) Inhaler 2Puff(s) Inhalation two times a day  levothyroxine 112MICROGram(s) Oral daily  ertapenem  IVPB 1000milliGRAM(s) IV Intermittent every 24 hours  sodium chloride 0.9% lock flush 3milliLiter(s) IV Push every 8 hours  docusate sodium 100milliGRAM(s) Oral three times a day  niCARdipine Infusion 5mG/Hr IV Continuous <Continuous>  pantoprazole  Injectable 40milliGRAM(s) IV Push every 12 hours  magnesium sulfate  IVPB 2Gram(s) IV Intermittent once    MEDICATIONS  (PRN):  acetaminophen   Tablet. 650milliGRAM(s) Oral every 6 hours PRN Moderate Pain (4 - 6)

## 2017-02-17 NOTE — ADVANCED PRACTICE NURSE CONSULT - RECOMMEDATIONS
Recommended RN staff to follow [facility] pressure injury treatment guideline for stage 1 pressure injury:    1.	3M no sting Liquid Barrier Film (Apply daily for protection;  allows for skin   reassessment )  2.	Allevyn foam silicone Foam Dressing  (apply to areas at high risk for shearing forces; skin beneath dressing must be assessed every shift and silicone foam  dressing reapplied;  dressing changed every 5 days)  3.	Protective Barrier Creams Deanna protect in presence of Incontinence Associated Dermatitis or Moisture Associated Dermatitis.

## 2017-02-17 NOTE — PROGRESS NOTE ADULT - ASSESSMENT
Assessment  s/p Bio AVr and Type 1 dissection repair 12/2016 at  [facility], residual dissection flap descending aorta  LVEF 40-45%, improved post op  PNA r/o sepsis  UTI  Acclereated htn  Anemia      Rec;  Agree with taper off nicardipine and resume Coreg 3.125 mg po BID  transfuse Hgb > 9  blood cultures   cont IV AB  cont DAPT  Will follow Assessment  s/p Bio AVr and Type 1 dissection repair 12/2016 at [facility], residual dissection flap descending aorta  LVEF 40-45%, improved post op  PNA r/o sepsis  UTI  Acclereated htn  Anemia      Rec;  Agree with taper off nicardipine and resume Coreg 6.25 mg po BID  transfuse Hgb > 8.5  blood cultures   cont IV AB  cont DAPT  Will follow Assessment  s/p Bio AVr and Type 1 dissection repair 12/2016 at [facility], residual dissection flap descending aorta  LVEF 40-45%, improved post op  PNA r/o sepsis  UTI  Acclereated htn  Anemia      Rec;  Agree with taper off nicardipine and resume Coreg 6.25 mg po BID  transfuse Hgb > 8.5  blood cultures   cont IV AB  cont DAPT  will obtain CT from [facility] stay  Will follow

## 2017-02-18 LAB
ALBUMIN SERPL ELPH-MCNC: 2.1 G/DL — LOW (ref 3.3–5.2)
ALP SERPL-CCNC: 63 U/L — SIGNIFICANT CHANGE UP (ref 40–120)
ALT FLD-CCNC: 11 U/L — SIGNIFICANT CHANGE UP
ANION GAP SERPL CALC-SCNC: 10 MMOL/L — SIGNIFICANT CHANGE UP (ref 5–17)
ANISOCYTOSIS BLD QL: SLIGHT — SIGNIFICANT CHANGE UP
APTT BLD: 31.7 SEC — SIGNIFICANT CHANGE UP (ref 27.5–37.4)
AST SERPL-CCNC: 13 U/L — SIGNIFICANT CHANGE UP
BILIRUB SERPL-MCNC: 0.4 MG/DL — SIGNIFICANT CHANGE UP (ref 0.4–2)
BUN SERPL-MCNC: 8 MG/DL — SIGNIFICANT CHANGE UP (ref 8–20)
CALCIUM SERPL-MCNC: 7.8 MG/DL — LOW (ref 8.6–10.2)
CHLORIDE SERPL-SCNC: 99 MMOL/L — SIGNIFICANT CHANGE UP (ref 98–107)
CO2 SERPL-SCNC: 24 MMOL/L — SIGNIFICANT CHANGE UP (ref 22–29)
CREAT SERPL-MCNC: 0.39 MG/DL — LOW (ref 0.5–1.3)
EOSINOPHIL NFR BLD AUTO: 1 % — SIGNIFICANT CHANGE UP (ref 0–5)
GLUCOSE SERPL-MCNC: 103 MG/DL — SIGNIFICANT CHANGE UP (ref 70–115)
HCT VFR BLD CALC: 30.4 % — LOW (ref 37–47)
HGB BLD-MCNC: 9.9 G/DL — LOW (ref 12–16)
HYPOCHROMIA BLD QL: SLIGHT — SIGNIFICANT CHANGE UP
INR BLD: 1.43 RATIO — HIGH (ref 0.88–1.16)
LYMPHOCYTES # BLD AUTO: 2 % — LOW (ref 20–55)
MACROCYTES BLD QL: SLIGHT — SIGNIFICANT CHANGE UP
MAGNESIUM SERPL-MCNC: 2.1 MG/DL — SIGNIFICANT CHANGE UP (ref 1.8–2.5)
MCHC RBC-ENTMCNC: 28 PG — SIGNIFICANT CHANGE UP (ref 27–31)
MCHC RBC-ENTMCNC: 32.6 G/DL — SIGNIFICANT CHANGE UP (ref 32–36)
MCV RBC AUTO: 86.1 FL — SIGNIFICANT CHANGE UP (ref 81–99)
MICROCYTES BLD QL: SLIGHT — SIGNIFICANT CHANGE UP
MONOCYTES NFR BLD AUTO: 8 % — SIGNIFICANT CHANGE UP (ref 3–10)
NEUTROPHILS NFR BLD AUTO: 87 % — HIGH (ref 37–73)
NEUTS BAND # BLD: 1 % — SIGNIFICANT CHANGE UP (ref 0–8)
PHOSPHATE SERPL-MCNC: 2.1 MG/DL — LOW (ref 2.4–4.7)
PLAT MORPH BLD: NORMAL — SIGNIFICANT CHANGE UP
PLATELET # BLD AUTO: 252 K/UL — SIGNIFICANT CHANGE UP (ref 150–400)
POIKILOCYTOSIS BLD QL AUTO: SLIGHT — SIGNIFICANT CHANGE UP
POTASSIUM SERPL-MCNC: 3.8 MMOL/L — SIGNIFICANT CHANGE UP (ref 3.5–5.3)
POTASSIUM SERPL-SCNC: 3.8 MMOL/L — SIGNIFICANT CHANGE UP (ref 3.5–5.3)
PROT SERPL-MCNC: 6.2 G/DL — LOW (ref 6.6–8.7)
PROTHROM AB SERPL-ACNC: 15.8 SEC — HIGH (ref 10–13.1)
RBC # BLD: 3.53 M/UL — LOW (ref 4.4–5.2)
RBC # FLD: 16.6 % — HIGH (ref 11–15.6)
RBC BLD AUTO: ABNORMAL
SODIUM SERPL-SCNC: 133 MMOL/L — LOW (ref 135–145)
VARIANT LYMPHS # BLD: 1 % — SIGNIFICANT CHANGE UP (ref 0–6)
WBC # BLD: 9.6 K/UL — SIGNIFICANT CHANGE UP (ref 4.8–10.8)
WBC # FLD AUTO: 9.6 K/UL — SIGNIFICANT CHANGE UP (ref 4.8–10.8)

## 2017-02-18 PROCEDURE — 99291 CRITICAL CARE FIRST HOUR: CPT

## 2017-02-18 RX ORDER — CARVEDILOL PHOSPHATE 80 MG/1
1 CAPSULE, EXTENDED RELEASE ORAL
Qty: 0 | Refills: 0 | COMMUNITY
Start: 2017-02-18

## 2017-02-18 RX ORDER — AMLODIPINE BESYLATE 2.5 MG/1
1 TABLET ORAL
Qty: 0 | Refills: 0 | COMMUNITY
Start: 2017-02-18

## 2017-02-18 RX ORDER — ZOLPIDEM TARTRATE 10 MG/1
5 TABLET ORAL ONCE
Qty: 0 | Refills: 0 | Status: DISCONTINUED | OUTPATIENT
Start: 2017-02-18 | End: 2017-02-18

## 2017-02-18 RX ORDER — ASPIRIN/CALCIUM CARB/MAGNESIUM 324 MG
81 TABLET ORAL DAILY
Qty: 0 | Refills: 0 | Status: DISCONTINUED | OUTPATIENT
Start: 2017-02-18 | End: 2017-02-20

## 2017-02-18 RX ORDER — DOCUSATE SODIUM 100 MG
1 CAPSULE ORAL
Qty: 0 | Refills: 0 | COMMUNITY
Start: 2017-02-18

## 2017-02-18 RX ADMIN — CARVEDILOL PHOSPHATE 6.25 MILLIGRAM(S): 80 CAPSULE, EXTENDED RELEASE ORAL at 17:03

## 2017-02-18 RX ADMIN — AMLODIPINE BESYLATE 10 MILLIGRAM(S): 2.5 TABLET ORAL at 05:11

## 2017-02-18 RX ADMIN — CARVEDILOL PHOSPHATE 6.25 MILLIGRAM(S): 80 CAPSULE, EXTENDED RELEASE ORAL at 05:12

## 2017-02-18 RX ADMIN — Medication 650 MILLIGRAM(S): at 05:12

## 2017-02-18 RX ADMIN — GABAPENTIN 100 MILLIGRAM(S): 400 CAPSULE ORAL at 05:11

## 2017-02-18 RX ADMIN — SODIUM CHLORIDE 3 MILLILITER(S): 9 INJECTION INTRAMUSCULAR; INTRAVENOUS; SUBCUTANEOUS at 14:27

## 2017-02-18 RX ADMIN — Medication 0.12 MILLIGRAM(S): at 05:11

## 2017-02-18 RX ADMIN — PANTOPRAZOLE SODIUM 40 MILLIGRAM(S): 20 TABLET, DELAYED RELEASE ORAL at 05:12

## 2017-02-18 RX ADMIN — ATORVASTATIN CALCIUM 10 MILLIGRAM(S): 80 TABLET, FILM COATED ORAL at 21:26

## 2017-02-18 RX ADMIN — Medication 650 MILLIGRAM(S): at 17:04

## 2017-02-18 RX ADMIN — ZOLPIDEM TARTRATE 5 MILLIGRAM(S): 10 TABLET ORAL at 21:26

## 2017-02-18 RX ADMIN — Medication 650 MILLIGRAM(S): at 18:18

## 2017-02-18 RX ADMIN — Medication 100 MILLIGRAM(S): at 05:11

## 2017-02-18 RX ADMIN — SODIUM CHLORIDE 3 MILLILITER(S): 9 INJECTION INTRAMUSCULAR; INTRAVENOUS; SUBCUTANEOUS at 21:25

## 2017-02-18 RX ADMIN — SODIUM CHLORIDE 3 MILLILITER(S): 9 INJECTION INTRAMUSCULAR; INTRAVENOUS; SUBCUTANEOUS at 05:11

## 2017-02-18 RX ADMIN — Medication 650 MILLIGRAM(S): at 05:45

## 2017-02-18 RX ADMIN — GABAPENTIN 100 MILLIGRAM(S): 400 CAPSULE ORAL at 17:04

## 2017-02-18 RX ADMIN — Medication 100 MILLIGRAM(S): at 21:26

## 2017-02-18 RX ADMIN — Medication 112 MICROGRAM(S): at 05:11

## 2017-02-18 RX ADMIN — BUDESONIDE AND FORMOTEROL FUMARATE DIHYDRATE 2 PUFF(S): 160; 4.5 AEROSOL RESPIRATORY (INHALATION) at 08:41

## 2017-02-18 RX ADMIN — Medication 81 MILLIGRAM(S): at 11:11

## 2017-02-18 RX ADMIN — Medication 100 MILLIGRAM(S): at 14:28

## 2017-02-18 NOTE — PROGRESS NOTE ADULT - ATTENDING COMMENTS
blood cult negative  pt afebrile and feeling better  WBC normal  will control BP and likely d/c Monday to [facility]
doing well from yesterday  agree with above

## 2017-02-18 NOTE — PHYSICAL THERAPY INITIAL EVALUATION ADULT - ADDITIONAL COMMENTS
[place] 7-8 steps to enter and about 5 steps to main level. Presently, the pt is from subacute rehab at [facility].

## 2017-02-18 NOTE — PROGRESS NOTE ADULT - PROBLEM SELECTOR PLAN 4
Improved  Digoxin 0.125mg PO qdaily   Daily weight   strict I's and O's  Repeat CXR- no changes  lungs appear clear, no pitting edema in lower ext
Improved  Digoxin 0.125mg PO qdaily   Daily weight 66.6kg  I/O: 2012.5/915 NET:+1097.5  Repeat CXR today  lungs appear clear, no pitting edema in lower ext

## 2017-02-18 NOTE — PROGRESS NOTE ADULT - ASSESSMENT
77 yo female with PMH COPD, CHF, hypothyroidism, sacral decub, AAA s/p repair 7/16, CAD s/p 1V CABG/bioprosthetic AVR/ ascending aneurysm repair 12/16, sent from [facility] because of lethargy and unresponsiveness, noted to have sacral decubs and possible sepsis. Pt was being treated for PNA at rehab center.    CT chest showing aortic dissection extending to renal artery. Rapid response on 2/16 for diaphoresis and hypotension in SBP of 80s to 90s.   Patient transferred to CT ICU for close monitor.

## 2017-02-18 NOTE — PHYSICAL THERAPY INITIAL EVALUATION ADULT - ACTIVE RANGE OF MOTION EXAMINATION, REHAB EVAL
b/l shoulder flexion to 120deg/deficits as listed below/bilateral  lower extremity Active ROM was WFL (within functional limits)/bilateral upper extremity Active ROM was WFL (within functional limits)

## 2017-02-18 NOTE — PROGRESS NOTE ADULT - SUBJECTIVE AND OBJECTIVE BOX
CC: Lethargy    Patient seen and examined at bedside,   Patient with Type A AAA, Had Rapid response yesterday for hypotension and was transferred to CICU.  Patient was on volume resuscitated and is doing well. BP controlled via Cardene drip.   Patient not ambulating, NPO, + snow with good output and last BM 2 days prior .  Cardiac monitor reviewed no acute events over night  ROS: Denies fever, chest pain, SOB, abdominal pain, diarrhea, constipation, calf pain. Patient has +non-productive cough      Vital Signs Last 24 Hrs  T(C): 36.6, Max: 37 (02-17 @ 16:00)  T(F): 97.9, Max: 98.6 (02-17 @ 16:00)  HR: 77 (58 - 77)  BP: 144/66 (118/58 - 144/66)  BP(mean): 88 (83 - 88)  RR: 16 (13 - 24)  SpO2: 97% (91% - 99%)    Physical Exam:   Gen: NAD, Comfortable laying in bed  HEENT: NCAT, PERRLA  Neck: No JVD  CVS: RRR, +s1/s2, no murmur appreciated  Lungs: decreased air entry bilaterally, no wheeze, rales or rhonchi.   Abdomen: +BS, soft, NT, ND  Ext, No cyanosis, edema or calf tenderness.    Labs:                            9.0    9.8   )-----------( 261      ( 17 Feb 2017 18:20 )             27.6     17 Feb 2017 04:55    134    |  102    |  13.0   ----------------------------<  88     4.4     |  23.0   |  0.48     Ca    8.0        17 Feb 2017 04:55  Phos  2.7       17 Feb 2017 04:55  Mg     1.8       17 Feb 2017 04:55        I & Os for 24h ending 02-16 @ 07:00  =============================================  IN: 2100 ml / OUT: 375 ml / NET: 1725 ml    I & Os for current day (as of 02-17 @ 06:54)  =============================================  IN: 1852.5 ml / OUT: 715 ml / NET: 1137.5 ml    I&O's Summary  I & Os for 24h ending 17 Feb 2017 07:00  =============================================  IN: 2012.5 ml / OUT: 915 ml / NET: 1097.5 ml    I & Os for current day (as of 18 Feb 2017 06:28)  =============================================  IN: 2013 ml / OUT: 1330 ml / NET: 683 ml      MEDICATIONS  (STANDING):  sodium chloride 0.9%. 1000milliLiter(s) IV Continuous <Continuous>  digoxin     Tablet 0.125milliGRAM(s) Oral daily  gabapentin 100milliGRAM(s) Oral two times a day  atorvastatin 10milliGRAM(s) Oral at bedtime  buDESOnide 160 MICROgram(s)/formoterol 4.5 MICROgram(s) Inhaler 2Puff(s) Inhalation two times a day  levothyroxine 112MICROGram(s) Oral daily  ertapenem  IVPB 1000milliGRAM(s) IV Intermittent every 24 hours  sodium chloride 0.9% lock flush 3milliLiter(s) IV Push every 8 hours  docusate sodium 100milliGRAM(s) Oral three times a day  pantoprazole    Tablet 40milliGRAM(s) Oral before breakfast  carvedilol 6.25milliGRAM(s) Oral every 12 hours  amLODIPine   Tablet 10milliGRAM(s) Oral daily    MEDICATIONS  (PRN):  acetaminophen   Tablet. 650milliGRAM(s) Oral every 6 hours PRN Moderate Pain (4 - 6)  hydrALAZINE Injectable 10milliGRAM(s) IV Push every 6 hours PRN SBP > 160 mmHg

## 2017-02-19 LAB
ALBUMIN SERPL ELPH-MCNC: 2.2 G/DL — LOW (ref 3.3–5.2)
ALP SERPL-CCNC: 71 U/L — SIGNIFICANT CHANGE UP (ref 40–120)
ALT FLD-CCNC: 9 U/L — SIGNIFICANT CHANGE UP
ANION GAP SERPL CALC-SCNC: 12 MMOL/L — SIGNIFICANT CHANGE UP (ref 5–17)
AST SERPL-CCNC: 13 U/L — SIGNIFICANT CHANGE UP
BILIRUB SERPL-MCNC: 0.4 MG/DL — SIGNIFICANT CHANGE UP (ref 0.4–2)
BUN SERPL-MCNC: 6 MG/DL — LOW (ref 8–20)
CALCIUM SERPL-MCNC: 7.9 MG/DL — LOW (ref 8.6–10.2)
CHLORIDE SERPL-SCNC: 94 MMOL/L — LOW (ref 98–107)
CO2 SERPL-SCNC: 26 MMOL/L — SIGNIFICANT CHANGE UP (ref 22–29)
CREAT SERPL-MCNC: 0.34 MG/DL — LOW (ref 0.5–1.3)
CULTURE RESULTS: SIGNIFICANT CHANGE UP
GLUCOSE SERPL-MCNC: 99 MG/DL — SIGNIFICANT CHANGE UP (ref 70–115)
HCT VFR BLD CALC: 35.7 % — LOW (ref 37–47)
HGB BLD-MCNC: 11.4 G/DL — LOW (ref 12–16)
MAGNESIUM SERPL-MCNC: 2 MG/DL — SIGNIFICANT CHANGE UP (ref 1.8–2.5)
MCHC RBC-ENTMCNC: 28.3 PG — SIGNIFICANT CHANGE UP (ref 27–31)
MCHC RBC-ENTMCNC: 31.9 G/DL — LOW (ref 32–36)
MCV RBC AUTO: 88.6 FL — SIGNIFICANT CHANGE UP (ref 81–99)
PLATELET # BLD AUTO: 237 K/UL — SIGNIFICANT CHANGE UP (ref 150–400)
POTASSIUM SERPL-MCNC: 4.1 MMOL/L — SIGNIFICANT CHANGE UP (ref 3.5–5.3)
POTASSIUM SERPL-SCNC: 4.1 MMOL/L — SIGNIFICANT CHANGE UP (ref 3.5–5.3)
PROT SERPL-MCNC: 6.9 G/DL — SIGNIFICANT CHANGE UP (ref 6.6–8.7)
RBC # BLD: 4.03 M/UL — LOW (ref 4.4–5.2)
RBC # FLD: 16.3 % — HIGH (ref 11–15.6)
SODIUM SERPL-SCNC: 132 MMOL/L — LOW (ref 135–145)
SPECIMEN SOURCE: SIGNIFICANT CHANGE UP
WBC # BLD: 9.5 K/UL — SIGNIFICANT CHANGE UP (ref 4.8–10.8)
WBC # FLD AUTO: 9.5 K/UL — SIGNIFICANT CHANGE UP (ref 4.8–10.8)

## 2017-02-19 PROCEDURE — 99233 SBSQ HOSP IP/OBS HIGH 50: CPT

## 2017-02-19 RX ADMIN — Medication 100 MILLIGRAM(S): at 21:17

## 2017-02-19 RX ADMIN — AMLODIPINE BESYLATE 10 MILLIGRAM(S): 2.5 TABLET ORAL at 05:07

## 2017-02-19 RX ADMIN — Medication 650 MILLIGRAM(S): at 10:26

## 2017-02-19 RX ADMIN — Medication 112 MICROGRAM(S): at 05:07

## 2017-02-19 RX ADMIN — GABAPENTIN 100 MILLIGRAM(S): 400 CAPSULE ORAL at 18:29

## 2017-02-19 RX ADMIN — Medication 650 MILLIGRAM(S): at 08:33

## 2017-02-19 RX ADMIN — Medication 100 MILLIGRAM(S): at 05:07

## 2017-02-19 RX ADMIN — Medication 81 MILLIGRAM(S): at 13:02

## 2017-02-19 RX ADMIN — SODIUM CHLORIDE 3 MILLILITER(S): 9 INJECTION INTRAMUSCULAR; INTRAVENOUS; SUBCUTANEOUS at 21:17

## 2017-02-19 RX ADMIN — BUDESONIDE AND FORMOTEROL FUMARATE DIHYDRATE 2 PUFF(S): 160; 4.5 AEROSOL RESPIRATORY (INHALATION) at 08:51

## 2017-02-19 RX ADMIN — GABAPENTIN 100 MILLIGRAM(S): 400 CAPSULE ORAL at 05:07

## 2017-02-19 RX ADMIN — CARVEDILOL PHOSPHATE 6.25 MILLIGRAM(S): 80 CAPSULE, EXTENDED RELEASE ORAL at 05:10

## 2017-02-19 RX ADMIN — SODIUM CHLORIDE 3 MILLILITER(S): 9 INJECTION INTRAMUSCULAR; INTRAVENOUS; SUBCUTANEOUS at 13:02

## 2017-02-19 RX ADMIN — SODIUM CHLORIDE 3 MILLILITER(S): 9 INJECTION INTRAMUSCULAR; INTRAVENOUS; SUBCUTANEOUS at 05:06

## 2017-02-19 RX ADMIN — CARVEDILOL PHOSPHATE 6.25 MILLIGRAM(S): 80 CAPSULE, EXTENDED RELEASE ORAL at 18:29

## 2017-02-19 RX ADMIN — Medication 0.12 MILLIGRAM(S): at 05:07

## 2017-02-19 RX ADMIN — BUDESONIDE AND FORMOTEROL FUMARATE DIHYDRATE 2 PUFF(S): 160; 4.5 AEROSOL RESPIRATORY (INHALATION) at 20:25

## 2017-02-19 RX ADMIN — Medication 100 MILLIGRAM(S): at 13:02

## 2017-02-19 RX ADMIN — PANTOPRAZOLE SODIUM 40 MILLIGRAM(S): 20 TABLET, DELAYED RELEASE ORAL at 05:07

## 2017-02-19 RX ADMIN — ATORVASTATIN CALCIUM 10 MILLIGRAM(S): 80 TABLET, FILM COATED ORAL at 21:17

## 2017-02-19 NOTE — PROGRESS NOTE ADULT - SUBJECTIVE AND OBJECTIVE BOX
[facility], THE HE[facility] CENTER                                   [address] [place] [address]                                                      PHONE: [phone]                                                         FAX: [phone]  [email or web address]  ---------------------------------------------------------------------------------------------------------------------------------    Overnight events/patient complaints:  NAD feeling ok; very poor historian     Codeine Phosphate (Other)  ferrous sulfate (Other)  penicillin (Other)    MEDICATIONS  (STANDING):  digoxin     Tablet 0.125milliGRAM(s) Oral daily  gabapentin 100milliGRAM(s) Oral two times a day  atorvastatin 10milliGRAM(s) Oral at bedtime  buDESOnide 160 MICROgram(s)/formoterol 4.5 MICROgram(s) Inhaler 2Puff(s) Inhalation two times a day  levothyroxine 112MICROGram(s) Oral daily  sodium chloride 0.9% lock flush 3milliLiter(s) IV Push every 8 hours  docusate sodium 100milliGRAM(s) Oral three times a day  pantoprazole    Tablet 40milliGRAM(s) Oral before breakfast  carvedilol 6.25milliGRAM(s) Oral every 12 hours  amLODIPine   Tablet 10milliGRAM(s) Oral daily  aspirin enteric coated 81milliGRAM(s) Oral daily    MEDICATIONS  (PRN):  acetaminophen   Tablet. 650milliGRAM(s) Oral every 6 hours PRN Moderate Pain (4 - 6)  hydrALAZINE Injectable 10milliGRAM(s) IV Push every 6 hours PRN SBP > 160 mmHg      Vital Signs Last 24 Hrs  T(C): 36.6, Max: 37.2 (02-19 @ 02:00)  T(F): 97.9, Max: 99 (02-19 @ 02:00)  HR: 72 (68 - 72)  BP: 142/68 (130/60 - 142/68)  BP(mean): --  RR: 16 (14 - 16)  SpO2: 98% (98% - 99%)  ICU Vital Signs Last 24 Hrs  MAYNOR CHANEY  I&O's Detail    I & Os for current day (as of 19 Feb 2017 09:34)  =============================================  IN:    Oral Fluid: 120 ml    Total IN: 120 ml  ---------------------------------------------  OUT:    Indwelling Catheter - Urethral: 2775 ml    Total OUT: 2775 ml  ---------------------------------------------  Total NET: -2655 ml    I&O's Summary    I & Os for current day (as of 19 Feb 2017 09:34)  =============================================  IN: 120 ml / OUT: 2775 ml / NET: -2655 ml    Drug Dosing Weight  MAYNOR CHANEY      PHYSICAL EXAM:  General: Appears well developed, well nourished alert and cooperative.  HEENT: Head; normocephalic, atraumatic.  Eyes: Pupils reactive, cornea wnl.  Neck: Supple, no nodes adenopathy, no NVD or carotid bruit or thyromegaly.  CARDIOVASCULAR: Normal S1 and S2, 3/6 murmur, rub, gallop or lift.   LUNGS: Decrease BS B/L   ABDOMEN: Soft, nontender without mass or organomegaly. bowel sounds normoactive.  EXTREMITIES: No clubbing, cyanosis or edema. Distal pulses wnl.   SKIN: warm and dry with normal turgor.  NEURO: Alert/oriented x 2 poor historian       LABS:                        9.9    9.6   )-----------( 252      ( 18 Feb 2017 06:41 )             30.4     18 Feb 2017 06:41    133    |  99     |  8.0    ----------------------------<  103    3.8     |  24.0   |  0.39     Ca    7.8        18 Feb 2017 06:41  Phos  2.1       18 Feb 2017 06:41  Mg     2.1       18 Feb 2017 06:41    TPro  6.2    /  Alb  2.1    /  TBili  0.4    /  DBili  x      /  AST  13     /  ALT  11     /  AlkPhos  63     18 Feb 2017 06:41    MAYNOR CHANEY      PT/INR - ( 18 Feb 2017 06:41 )   PT: 15.8 sec;   INR: 1.43 ratio         PTT - ( 18 Feb 2017 06:41 )  PTT:31.7 sec      RADIOLOGY & ADDITIONAL STUDIES:    INTERPRETATION OF TELEMETRY (personally reviewed):    ECG:    ECHO:     Summary:   1. Left ventricular ejection fraction, by visual estimation, is 40 to   45%.   2. Mildly to moderately decreased global left ventricular systolic   function.   3. Spectral Doppler shows impaired relaxation pattern of left   ventricular myocardial filling (Grade I diastolic dysfunction).   4. Normal left ventricular internal cavity size.   5. There is mild concentric left ventricular hypertrophy.   6. Normal right ventricular size and function.   7. The left atrium is normal in size.   8. The right atrium is normal in size.   9. Mild to moderate mitral annular calcification.  10. Thickening of the anterior and posterior mitral valve leaflets.  11. Mild mitral valve regurgitation.  12. Bioprosthesis in the aortic position demosntrating normal function.  13. Mild aortic regurgitation.  14. Prosthetic graft in the ascending aorta position. No evidence of leak   in limited views of the ascending aortic prosthesis.      ASSESSMENT AND PLAN:    In summary, MAYNOR CHANEY is an 76y Female with past medical history significant for s/p recent Bio AVR intraop ascending dissection s/p repair post op EF 25% now 40% and normal functioning Bio AVR; anemia s/p PRBC sepsis     plan  1.  Start a low dose ACEI if no contraindication  2.  Continue other CV medications    3.  Continue TELE

## 2017-02-19 NOTE — PROGRESS NOTE ADULT - SUBJECTIVE AND OBJECTIVE BOX
Patient is a 76y old  Female who presents with a chief complaint of lethargy, pna  doing well. 11 beats of VT, asymptomatic.   cardio called and aware        CARDIOVASCULAR:  digoxin     Tablet 0.125milliGRAM(s) Oral daily  carvedilol 6.25milliGRAM(s) Oral every 12 hours  hydrALAZINE Injectable 10milliGRAM(s) IV Push every 6 hours PRN  amLODIPine   Tablet 10milliGRAM(s) Oral daily    PULMONARY:  buDESOnide 160 MICROgram(s)/formoterol 4.5 MICROgram(s) Inhaler 2Puff(s) Inhalation two times a day    NEUROLOGIC:  gabapentin 100milliGRAM(s) Oral two times a day  acetaminophen   Tablet. 650milliGRAM(s) Oral every 6 hours PRN    HEMATOLOGIC:  aspirin enteric coated 81milliGRAM(s) Oral daily    GATROINTESTINAL:  docusate sodium 100milliGRAM(s) Oral three times a day  pantoprazole    Tablet 40milliGRAM(s) Oral before breakfast      ENDO/METABOLIC:  atorvastatin 10milliGRAM(s) Oral at bedtime  levothyroxine 112MICROGram(s) Oral daily      Allergies    Codeine Phosphate (Other)  ferrous sulfate (Other)  penicillin (Other)        Vital Signs Last 24 Hrs  T(C): 36.5, Max: 37.2 (02-19 @ 02:00)  T(F): 97.7, Max: 99 (02-19 @ 02:00)  HR: 73 (68 - 73)  BP: 120/64 (120/64 - 142/68)  BP(mean): --  RR: 18 (14 - 18)  SpO2: 97% (97% - 99%)      REVIEW OF SYSTEMS:    CONSTITUTIONAL: No fever, weight loss, or fatigue  EYES: No eye pain, visual disturbances, or discharge  ENMT:  No difficulty hearing, tinnitus, vertigo; No sinus or throat pain  NECK: No pain or stiffness  RESPIRATORY: No cough, wheezing, chills or hemoptysis; No shortness of breath  CARDIOVASCULAR: No chest pain, palpitations, dizziness, or leg swelling  GASTROINTESTINAL: No abdominal or epigastric pain. No nausea, vomiting, or hematemesis; No diarrhea or constipation. No melena or hematochezia.  NEUROLOGICAL: No headaches, memory loss, loss of strength, numbness, or tremors  SKIN: No itching, burning, rashes, or lesions   LYMPH NODES: No enlarged glands        PHYSICAL EXAM:    GENERAL: NAD, well-groomed, well-developed  HEAD:  Atraumatic, Normocephalic  EYES: EOMI, PERRLA, conjunctiva and sclera clear  ENMT: No tonsillar erythema, exudates, or enlargement; Moist mucous membranes, Good dentition, No lesions  NECK: Supple, No JVD, Normal thyroid  NERVOUS SYSTEM:  Alert & Oriented X3,  CHEST/LUNG: Clear to percussion bilaterally; No rales, rhonchi, wheezing, or rubs  HEART: Regular rate and rhythm; No murmurs, rubs, or gallops  ABDOMEN: Soft, Nontender, Nondistended; Bowel sounds present  EXTREMITIES:  2+ Peripheral Pulses, No clubbing, cyanosis, or edema        LABS:                        9.9    9.6   )-----------( 252      ( 18 Feb 2017 06:41 )             30.4     CBC Full  -  ( 18 Feb 2017 06:41 )  WBC Count : 9.6 K/uL  Hemoglobin : 9.9 g/dL  Hematocrit : 30.4 %  Platelet Count - Automated : 252 K/uL  Mean Cell Volume : 86.1 fl  Mean Cell Hemoglobin : 28.0 pg  Mean Cell Hemoglobin Concentration : 32.6 g/dL  Auto Neutrophil # : x  Auto Lymphocyte # : x  Auto Monocyte # : x  Auto Eosinophil # : x  Auto Basophil # : x  Auto Neutrophil % : 87.0 %  Auto Lymphocyte % : 2.0 %  Auto Monocyte % : 8.0 %  Auto Eosinophil % : 1.0 %  Auto Basophil % : x    18 Feb 2017 06:41    133    |  99     |  8.0    ----------------------------<  103    3.8     |  24.0   |  0.39     Ca    7.8        18 Feb 2017 06:41  Phos  2.1       18 Feb 2017 06:41  Mg     2.1       18 Feb 2017 06:41    TPro  6.2    /  Alb  2.1    /  TBili  0.4    /  DBili  x      /  AST  13     /  ALT  11     /  AlkPhos  63     18 Feb 2017 06:41    PT/INR - ( 18 Feb 2017 06:41 )   PT: 15.8 sec;   INR: 1.43 ratio         PTT - ( 18 Feb 2017 06:41 )  PTT:31.7 sec      Culture Results:   Rare Routine respiratory jenae present  Moderate Candida albicans  Culture in progress (02-17 @ 17:03)  Culture Results:   50,000 CFU/ml Lactobacillus species (02-15 @ 10:14)  Culture Results:   No growth at 48 hours (02-15 @ 09:23)  Culture Results:   No growth at 48 hours (02-15 @ 07:25)          LIVER FUNCTIONS - ( 18 Feb 2017 06:41 )  Alb: 2.1 g/dL / Pro: 6.2 g/dL / ALK PHOS: 63 U/L / ALT: 11 U/L / AST: 13 U/L / GGT: x

## 2017-02-20 VITALS
DIASTOLIC BLOOD PRESSURE: 60 MMHG | RESPIRATION RATE: 18 BRPM | SYSTOLIC BLOOD PRESSURE: 110 MMHG | TEMPERATURE: 98 F | HEART RATE: 90 BPM | OXYGEN SATURATION: 95 %

## 2017-02-20 DIAGNOSIS — I50.23 ACUTE ON CHRONIC SYSTOLIC (CONGESTIVE) HEART FAILURE: ICD-10-CM

## 2017-02-20 LAB
CULTURE RESULTS: SIGNIFICANT CHANGE UP
CULTURE RESULTS: SIGNIFICANT CHANGE UP
SPECIMEN SOURCE: SIGNIFICANT CHANGE UP
SPECIMEN SOURCE: SIGNIFICANT CHANGE UP

## 2017-02-20 PROCEDURE — 80048 BASIC METABOLIC PNL TOTAL CA: CPT

## 2017-02-20 PROCEDURE — 85610 PROTHROMBIN TIME: CPT

## 2017-02-20 PROCEDURE — 99221 1ST HOSP IP/OBS SF/LOW 40: CPT

## 2017-02-20 PROCEDURE — 94760 N-INVAS EAR/PLS OXIMETRY 1: CPT

## 2017-02-20 PROCEDURE — 87070 CULTURE OTHR SPECIMN AEROBIC: CPT

## 2017-02-20 PROCEDURE — 85730 THROMBOPLASTIN TIME PARTIAL: CPT

## 2017-02-20 PROCEDURE — 80053 COMPREHEN METABOLIC PANEL: CPT

## 2017-02-20 PROCEDURE — 87086 URINE CULTURE/COLONY COUNT: CPT

## 2017-02-20 PROCEDURE — 82330 ASSAY OF CALCIUM: CPT

## 2017-02-20 PROCEDURE — 93306 TTE W/DOPPLER COMPLETE: CPT

## 2017-02-20 PROCEDURE — 86900 BLOOD TYPING SEROLOGIC ABO: CPT

## 2017-02-20 PROCEDURE — 71250 CT THORAX DX C-: CPT

## 2017-02-20 PROCEDURE — 96374 THER/PROPH/DIAG INJ IV PUSH: CPT

## 2017-02-20 PROCEDURE — 82803 BLOOD GASES ANY COMBINATION: CPT

## 2017-02-20 PROCEDURE — C8929: CPT

## 2017-02-20 PROCEDURE — 85027 COMPLETE CBC AUTOMATED: CPT

## 2017-02-20 PROCEDURE — 96375 TX/PRO/DX INJ NEW DRUG ADDON: CPT

## 2017-02-20 PROCEDURE — 86870 RBC ANTIBODY IDENTIFICATION: CPT

## 2017-02-20 PROCEDURE — 36415 COLL VENOUS BLD VENIPUNCTURE: CPT

## 2017-02-20 PROCEDURE — 84295 ASSAY OF SERUM SODIUM: CPT

## 2017-02-20 PROCEDURE — 83605 ASSAY OF LACTIC ACID: CPT

## 2017-02-20 PROCEDURE — 86880 COOMBS TEST DIRECT: CPT

## 2017-02-20 PROCEDURE — 94640 AIRWAY INHALATION TREATMENT: CPT

## 2017-02-20 PROCEDURE — 71045 X-RAY EXAM CHEST 1 VIEW: CPT

## 2017-02-20 PROCEDURE — 86850 RBC ANTIBODY SCREEN: CPT

## 2017-02-20 PROCEDURE — 85014 HEMATOCRIT: CPT

## 2017-02-20 PROCEDURE — 97163 PT EVAL HIGH COMPLEX 45 MIN: CPT

## 2017-02-20 PROCEDURE — 71275 CT ANGIOGRAPHY CHEST: CPT

## 2017-02-20 PROCEDURE — 83880 ASSAY OF NATRIURETIC PEPTIDE: CPT

## 2017-02-20 PROCEDURE — 36430 TRANSFUSION BLD/BLD COMPNT: CPT

## 2017-02-20 PROCEDURE — 84132 ASSAY OF SERUM POTASSIUM: CPT

## 2017-02-20 PROCEDURE — 96376 TX/PRO/DX INJ SAME DRUG ADON: CPT

## 2017-02-20 PROCEDURE — 84100 ASSAY OF PHOSPHORUS: CPT

## 2017-02-20 PROCEDURE — 86922 COMPATIBILITY TEST ANTIGLOB: CPT

## 2017-02-20 PROCEDURE — P9016: CPT

## 2017-02-20 PROCEDURE — 84145 PROCALCITONIN (PCT): CPT

## 2017-02-20 PROCEDURE — 99285 EMERGENCY DEPT VISIT HI MDM: CPT | Mod: 25

## 2017-02-20 PROCEDURE — 82947 ASSAY GLUCOSE BLOOD QUANT: CPT

## 2017-02-20 PROCEDURE — 99239 HOSP IP/OBS DSCHRG MGMT >30: CPT

## 2017-02-20 PROCEDURE — 82435 ASSAY OF BLOOD CHLORIDE: CPT

## 2017-02-20 PROCEDURE — 83735 ASSAY OF MAGNESIUM: CPT

## 2017-02-20 PROCEDURE — 85379 FIBRIN DEGRADATION QUANT: CPT

## 2017-02-20 PROCEDURE — 81001 URINALYSIS AUTO W/SCOPE: CPT

## 2017-02-20 PROCEDURE — 87040 BLOOD CULTURE FOR BACTERIA: CPT

## 2017-02-20 PROCEDURE — 80162 ASSAY OF DIGOXIN TOTAL: CPT

## 2017-02-20 PROCEDURE — 84484 ASSAY OF TROPONIN QUANT: CPT

## 2017-02-20 PROCEDURE — 93005 ELECTROCARDIOGRAM TRACING: CPT

## 2017-02-20 PROCEDURE — 86901 BLOOD TYPING SEROLOGIC RH(D): CPT

## 2017-02-20 RX ORDER — FUROSEMIDE 40 MG
40 TABLET ORAL DAILY
Qty: 0 | Refills: 0 | Status: DISCONTINUED | OUTPATIENT
Start: 2017-02-20 | End: 2017-02-20

## 2017-02-20 RX ORDER — MEGESTROL ACETATE 40 MG/ML
0 SUSPENSION ORAL
Qty: 0 | Refills: 0 | COMMUNITY

## 2017-02-20 RX ORDER — FUROSEMIDE 40 MG
1 TABLET ORAL
Qty: 0 | Refills: 0 | COMMUNITY
Start: 2017-02-20

## 2017-02-20 RX ORDER — HYDROMORPHONE HYDROCHLORIDE 2 MG/ML
1 INJECTION INTRAMUSCULAR; INTRAVENOUS; SUBCUTANEOUS
Qty: 0 | Refills: 0 | COMMUNITY

## 2017-02-20 RX ORDER — MIRTAZAPINE 45 MG/1
1 TABLET, ORALLY DISINTEGRATING ORAL
Qty: 0 | Refills: 0 | COMMUNITY

## 2017-02-20 RX ORDER — CARVEDILOL PHOSPHATE 80 MG/1
1 CAPSULE, EXTENDED RELEASE ORAL
Qty: 0 | Refills: 0 | COMMUNITY

## 2017-02-20 RX ADMIN — Medication 81 MILLIGRAM(S): at 11:55

## 2017-02-20 RX ADMIN — Medication 650 MILLIGRAM(S): at 05:14

## 2017-02-20 RX ADMIN — Medication 650 MILLIGRAM(S): at 11:55

## 2017-02-20 RX ADMIN — Medication 40 MILLIGRAM(S): at 11:55

## 2017-02-20 RX ADMIN — AMLODIPINE BESYLATE 10 MILLIGRAM(S): 2.5 TABLET ORAL at 05:13

## 2017-02-20 RX ADMIN — PANTOPRAZOLE SODIUM 40 MILLIGRAM(S): 20 TABLET, DELAYED RELEASE ORAL at 05:13

## 2017-02-20 RX ADMIN — BUDESONIDE AND FORMOTEROL FUMARATE DIHYDRATE 2 PUFF(S): 160; 4.5 AEROSOL RESPIRATORY (INHALATION) at 09:45

## 2017-02-20 RX ADMIN — Medication 100 MILLIGRAM(S): at 13:58

## 2017-02-20 RX ADMIN — SODIUM CHLORIDE 3 MILLILITER(S): 9 INJECTION INTRAMUSCULAR; INTRAVENOUS; SUBCUTANEOUS at 05:13

## 2017-02-20 RX ADMIN — CARVEDILOL PHOSPHATE 6.25 MILLIGRAM(S): 80 CAPSULE, EXTENDED RELEASE ORAL at 05:14

## 2017-02-20 RX ADMIN — GABAPENTIN 100 MILLIGRAM(S): 400 CAPSULE ORAL at 05:13

## 2017-02-20 RX ADMIN — Medication 650 MILLIGRAM(S): at 12:55

## 2017-02-20 RX ADMIN — Medication 112 MICROGRAM(S): at 05:13

## 2017-02-20 RX ADMIN — SODIUM CHLORIDE 3 MILLILITER(S): 9 INJECTION INTRAMUSCULAR; INTRAVENOUS; SUBCUTANEOUS at 13:58

## 2017-02-20 RX ADMIN — Medication 100 MILLIGRAM(S): at 05:13

## 2017-02-20 RX ADMIN — Medication 0.12 MILLIGRAM(S): at 05:13

## 2017-02-20 RX ADMIN — Medication 650 MILLIGRAM(S): at 05:45

## 2017-02-20 NOTE — PROGRESS NOTE ADULT - SUBJECTIVE AND OBJECTIVE BOX
[facility] - [facility], THE [facility]ART CENTER                                   [address] [place] [address]                                                      PHONE: [phone]                                                         FAX: [phone]  [email or web address]  ---------------------------------------------------------------------------------------------------------------------------------    FU for  Pt seen and examined.     Overnight events/patient complaints:    Codeine Phosphate (Other)  ferrous sulfate (Other)  penicillin (Other)    MEDICATIONS  (STANDING):  digoxin     Tablet 0.125milliGRAM(s) Oral daily  gabapentin 100milliGRAM(s) Oral two times a day  atorvastatin 10milliGRAM(s) Oral at bedtime  buDESOnide 160 MICROgram(s)/formoterol 4.5 MICROgram(s) Inhaler 2Puff(s) Inhalation two times a day  levothyroxine 112MICROGram(s) Oral daily  sodium chloride 0.9% lock flush 3milliLiter(s) IV Push every 8 hours  docusate sodium 100milliGRAM(s) Oral three times a day  pantoprazole    Tablet 40milliGRAM(s) Oral before breakfast  carvedilol 6.25milliGRAM(s) Oral every 12 hours  amLODIPine   Tablet 10milliGRAM(s) Oral daily  aspirin enteric coated 81milliGRAM(s) Oral daily    MEDICATIONS  (PRN):  acetaminophen   Tablet. 650milliGRAM(s) Oral every 6 hours PRN Moderate Pain (4 - 6)  hydrALAZINE Injectable 10milliGRAM(s) IV Push every 6 hours PRN SBP > 160 mmHg      Vital Signs Last 24 Hrs  T(C): 36.7, Max: 36.7 (02-19 @ 16:02)  T(F): 98, Max: 98 (02-19 @ 16:02)  HR: 80 (71 - 83)  BP: 110/52 (110/52 - 136/68)  BP(mean): --  RR: 18 (18 - 18)  SpO2: 100% (96% - 100%)  ICU Vital Signs Last 24 Hrs  I&O's Summary    I & Os for current day (as of 20 Feb 2017 09:06)  =============================================  IN: 270 ml / OUT: 0 ml / NET: 270 ml      PHYSICAL EXAM:  HEENT: no JVD  CARDIOVASCULAR: Normal S1 and S2, No murmur, rub, gallop or lift.   LUNGS: No rales, rhonchi or wheeze. Normal breath sounds bilaterally.  ABDOMEN: Soft, nontender without mass or organomegaly. bowel sounds normoactive.  EXTREMITIES: no edema          LABS:                        11.4   9.5   )-----------( 237      ( 19 Feb 2017 13:33 )             35.7     19 Feb 2017 13:33    132    |  94     |  6.0    ----------------------------<  99     4.1     |  26.0   |  0.34     Ca    7.9        19 Feb 2017 13:33  Mg     2.0       19 Feb 2017 13:33    TPro  6.9    /  Alb  2.2    /  TBili  0.4    /  DBili  x      /  AST  13     /  ALT  9      /  AlkPhos  71     19 Feb 2017 13:33    MAYNOR CHANEY            RADIOLOGY & ADDITIONAL STUDIES:    LABS:                        11.4   9.5   )-----------( 237      ( 19 Feb 2017 13:33 )             35.7     19 Feb 2017 13:33    132    |  94     |  6.0    ----------------------------<  99     4.1     |  26.0   |  0.34     Ca    7.9        19 Feb 2017 13:33  Mg     2.0       19 Feb 2017 13:33    TPro  6.9    /  Alb  2.2    /  TBili  0.4    /  DBili  x      /  AST  13     /  ALT  9      /  AlkPhos  71     19 Feb 2017 13:33    Assessment and Plan:  In summary, MAYNOR CHANEY is an 76y Female with past medical history significant for s/p recent Bio AVR intraop ascending dissection s/p repair post op EF 25% now 40% and normal functioning Bio AVR; anemia s/p PRBC sepsis   PNA/sepsis: [facility], [facility]                                   [address] [place] [address]                                                      PHONE: [phone]                                                         FAX: [phone]  [email or web address]  ---------------------------------------------------------------------------------------------------------------------------------    FU for  Pt seen and examined.     Overnight events/patient complaints: patient with mild sob    Codeine Phosphate (Other)  ferrous sulfate (Other)  penicillin (Other)    MEDICATIONS  (STANDING):  digoxin     Tablet 0.125milliGRAM(s) Oral daily  gabapentin 100milliGRAM(s) Oral two times a day  atorvastatin 10milliGRAM(s) Oral at bedtime  buDESOnide 160 MICROgram(s)/formoterol 4.5 MICROgram(s) Inhaler 2Puff(s) Inhalation two times a day  levothyroxine 112MICROGram(s) Oral daily  sodium chloride 0.9% lock flush 3milliLiter(s) IV Push every 8 hours  docusate sodium 100milliGRAM(s) Oral three times a day  pantoprazole    Tablet 40milliGRAM(s) Oral before breakfast  carvedilol 6.25milliGRAM(s) Oral every 12 hours  amLODIPine   Tablet 10milliGRAM(s) Oral daily  aspirin enteric coated 81milliGRAM(s) Oral daily    MEDICATIONS  (PRN):  acetaminophen   Tablet. 650milliGRAM(s) Oral every 6 hours PRN Moderate Pain (4 - 6)  hydrALAZINE Injectable 10milliGRAM(s) IV Push every 6 hours PRN SBP > 160 mmHg      Vital Signs Last 24 Hrs  T(C): 36.7, Max: 36.7 (02-19 @ 16:02)  T(F): 98, Max: 98 (02-19 @ 16:02)  HR: 80 (71 - 83)  BP: 110/52 (110/52 - 136/68)  BP(mean): --  RR: 18 (18 - 18)  SpO2: 100% (96% - 100%)  ICU Vital Signs Last 24 Hrs  I&O's Summary    I & Os for current day (as of 20 Feb 2017 09:06)  =============================================  IN: 270 ml / OUT: 0 ml / NET: 270 ml      PHYSICAL EXAM:  HEENT: no JVD  CARDIOVASCULAR: Normal S1 and S2, No murmur, rub, gallop or lift.   LUNGS: decreased air entry at bases  ABDOMEN: Soft, nontender without mass or organomegaly. bowel sounds normoactive.  EXTREMITIES: no edema          LABS:                        11.4   9.5   )-----------( 237      ( 19 Feb 2017 13:33 )             35.7     19 Feb 2017 13:33    132    |  94     |  6.0    ----------------------------<  99     4.1     |  26.0   |  0.34     Ca    7.9        19 Feb 2017 13:33  Mg     2.0       19 Feb 2017 13:33    TPro  6.9    /  Alb  2.2    /  TBili  0.4    /  DBili  x      /  AST  13     /  ALT  9      /  AlkPhos  71     19 Feb 2017 13:33    MAYNOR CHANEY            RADIOLOGY & ADDITIONAL STUDIES:    LABS:                        11.4   9.5   )-----------( 237      ( 19 Feb 2017 13:33 )             35.7     19 Feb 2017 13:33    132    |  94     |  6.0    ----------------------------<  99     4.1     |  26.0   |  0.34     Ca    7.9        19 Feb 2017 13:33  Mg     2.0       19 Feb 2017 13:33    TPro  6.9    /  Alb  2.2    /  TBili  0.4    /  DBili  x      /  AST  13     /  ALT  9      /  AlkPhos  71     19 Feb 2017 13:33    Assessment and Plan:  In summary, MAYNOR CHANEY is an 76y Female with past medical history significant for s/p recent Bio AVR intraop ascending dissection s/p repair post op EF 25% now 40% and normal functioning Bio AVR; anemia s/p PRBC sepsis   PNA/sepsis: on abx  aortic aneurysm repair/dissection: CT surgery input noted, medical Rx  SOb/CHF: EF 40%, will start on lasix

## 2017-02-20 NOTE — PROGRESS NOTE ADULT - PROBLEM SELECTOR PLAN 2
Type A - AAA  Stable  BP controlled well with current medication regime
cardio eval
viral in origin
Type A - AAA  Stable  Under care of CT ICU  On Cardene drip at 7mg/hr for tight bp control

## 2017-02-20 NOTE — PROGRESS NOTE ADULT - PROBLEM SELECTOR PROBLEM 3
Sepsis, due to unspecified organism
Acute on chronic systolic congestive heart failure
Coronary artery disease involving native coronary artery of native heart without angina pectoris
Hypothyroidism, unspecified type
Sepsis, due to unspecified organism

## 2017-02-20 NOTE — PROGRESS NOTE ADULT - PROBLEM SELECTOR PLAN 3
Improved  Invanz 1g IV q24h Day # 4  Lactate 0.5   WBC improvig  Afebrile   BCx negative x2   50,000 Lactobacillus in urine
on lasix
Improved  Invanz 1g IV q24h Day # 3  Lactate 0.5   WBC improvig  Afebrile   BCx x 2 pending  50,000 Lactobacillus in urine

## 2017-02-20 NOTE — PROGRESS NOTE ADULT - PROBLEM SELECTOR PROBLEM 2
Abdominal aortic aneurysm (AAA) without rupture
Acute on chronic congestive heart failure, unspecified congestive heart failure type
Acute on chronic congestive heart failure, unspecified congestive heart failure type
Sepsis, due to unspecified organism
Abdominal aortic aneurysm (AAA) without rupture

## 2017-02-20 NOTE — PROGRESS NOTE ADULT - PROBLEM SELECTOR PROBLEM 5
Coronary artery disease involving native coronary artery of native heart without angina pectoris

## 2017-02-20 NOTE — PROGRESS NOTE ADULT - SUBJECTIVE AND OBJECTIVE BOX
Patient is a 76y old  Female who presents with a chief complaint of lethargy, pna  s/p recent AVR and AAA repair along with CABG  admitted for temp of 102 and was hypotensive   +  dissection of aneurysm. transferred to CTICU  now stable and family at bedside.       CARDIOVASCULAR:  digoxin     Tablet 0.125milliGRAM(s) Oral daily  carvedilol 6.25milliGRAM(s) Oral every 12 hours  hydrALAZINE Injectable 10milliGRAM(s) IV Push every 6 hours PRN  amLODIPine   Tablet 10milliGRAM(s) Oral daily  furosemide    Tablet 40milliGRAM(s) Oral daily    PULMONARY:  buDESOnide 160 MICROgram(s)/formoterol 4.5 MICROgram(s) Inhaler 2Puff(s) Inhalation two times a day    NEUROLOGIC:  gabapentin 100milliGRAM(s) Oral two times a day  acetaminophen   Tablet. 650milliGRAM(s) Oral every 6 hours PRN      HEMATOLOGIC:  aspirin enteric coated 81milliGRAM(s) Oral daily    GATROINTESTINAL:  docusate sodium 100milliGRAM(s) Oral three times a day  pantoprazole    Tablet 40milliGRAM(s) Oral before breakfast      ENDO/METABOLIC:  atorvastatin 10milliGRAM(s) Oral at bedtime  levothyroxine 112MICROGram(s) Oral daily      Allergies    Codeine Phosphate (Other)  ferrous sulfate (Other)  penicillin (Other)    Vital Signs Last 24 Hrs  T(C): 36.8, Max: 36.8 (02-20 @ 11:59)  T(F): 98.3, Max: 98.3 (02-20 @ 11:59)  HR: 81 (71 - 83)  BP: 112/58 (110/52 - 136/68)  BP(mean): --  RR: 16 (16 - 18)  SpO2: 97% (96% - 100%)      REVIEW OF SYSTEMS:    CONSTITUTIONAL: No fever, weight loss, or fatigue  EYES: No eye pain, visual disturbances, or discharge  ENMT:  No difficulty hearing, tinnitus, vertigo; No sinus or throat pain  NECK: No pain or stiffness  RESPIRATORY: No wheezing, chills or hemoptysis; No shortness of breath. + cough with phlegm   CARDIOVASCULAR: No chest pain, palpitations, dizziness, or leg swelling  GASTROINTESTINAL: No abdominal or epigastric pain.  NEUROLOGICAL: No headaches, memory loss, loss of strength, numbness, or tremors  SKIN: No itching, burning, rashes, or lesions         PHYSICAL EXAM:    GENERAL: NAD, well-groomed, well-developed  HEAD:  Atraumatic, Normocephalic  EYES: EOMI, PERRLA, conjunctiva and sclera clear  ENMT: No tonsillar erythema, exudates, or enlargement; Moist mucous membranes, Good dentition, No lesions  NECK: Supple, No JVD, Normal thyroid  NERVOUS SYSTEM:  Alert & Oriented X3, Good concentration;  CHEST/LUNG: Clear to percussion bilaterally; No rales, rhonchi, wheezing, or rubs. Decreased air entry at bases   HEART: Regular rate and rhythm; No murmurs, rubs, or gallops  ABDOMEN: Soft, Nontender, Nondistended; Bowel sounds present  EXTREMITIES:  2+ Peripheral Pulses, No clubbing, cyanosis, or edema        LABS:                        11.4   9.5   )-----------( 237      ( 19 Feb 2017 13:33 )             35.7     CBC Full  -  ( 19 Feb 2017 13:33 )  WBC Count : 9.5 K/uL  Hemoglobin : 11.4 g/dL  Hematocrit : 35.7 %  Platelet Count - Automated : 237 K/uL      19 Feb 2017 13:33    132    |  94     |  6.0    ----------------------------<  99     4.1     |  26.0   |  0.34     Ca    7.9        19 Feb 2017 13:33  Mg     2.0       19 Feb 2017 13:33    TPro  6.9    /  Alb  2.2    /  TBili  0.4    /  DBili  x      /  AST  13     /  ALT  9      /  AlkPhos  71     19 Feb 2017 13:33          Culture Results:   Rare Routine respiratory jenae present  Moderate Candida albicans (02-17 @ 17:03)  Culture Results:   50,000 CFU/ml Lactobacillus species (02-15 @ 10:14)  Culture Results:   No growth at 5 days. (02-15 @ 09:23)  Culture Results:   No growth at 5 days. (02-15 @ 07:25)        Albumin, Serum: 2.2 g/dL (02-19 @ 13:33)    LIVER FUNCTIONS - ( 19 Feb 2017 13:33 )  Alb: 2.2 g/dL / Pro: 6.9 g/dL / ALK PHOS: 71 U/L / ALT: 9 U/L / AST: 13 U/L / GGT: x             RADIOLOGY & ADDITIONAL TESTS:

## 2017-02-20 NOTE — PROGRESS NOTE ADULT - PROBLEM SELECTOR PROBLEM 1
Hypotension, unspecified hypotension type
Abdominal aortic aneurysm (AAA) without rupture
Abdominal aortic aneurysm (AAA) without rupture
Sepsis, due to unspecified organism
Hypotension, unspecified hypotension type

## 2017-02-20 NOTE — PROGRESS NOTE ADULT - PROBLEM SELECTOR PROBLEM 4
Acute on chronic congestive heart failure, unspecified congestive heart failure type
Hypotension, unspecified hypotension type
Hypotension, unspecified hypotension type
Hypothyroidism, unspecified type
Acute on chronic congestive heart failure, unspecified congestive heart failure type

## 2017-02-20 NOTE — PROGRESS NOTE ADULT - PROBLEM SELECTOR PLAN 1
Resolved  Given IVF, transfused 1 unit PRBC
following blood cul  on [facility]
outpt follow up
Resolved  s/p rapid response yesterday for SBP 80s to 90s  Given IVF, transfused 1 unit PRBC  Patient now under care of CT ICU

## 2017-03-01 ENCOUNTER — INPATIENT (INPATIENT)
Facility: HOSPITAL | Age: 77
LOS: 6 days | Discharge: NURSING, REHAB OR LONG-TERM CARE | DRG: 193 | End: 2017-03-08
Attending: FAMILY MEDICINE | Admitting: HOSPITALIST
Payer: MEDICARE

## 2017-03-01 VITALS
RESPIRATION RATE: 18 BRPM | HEIGHT: 64 IN | HEART RATE: 63 BPM | TEMPERATURE: 98 F | OXYGEN SATURATION: 94 % | SYSTOLIC BLOOD PRESSURE: 101 MMHG | DIASTOLIC BLOOD PRESSURE: 50 MMHG | WEIGHT: 110.01 LBS

## 2017-03-01 DIAGNOSIS — D64.9 ANEMIA, UNSPECIFIED: ICD-10-CM

## 2017-03-01 DIAGNOSIS — Z95.2 PRESENCE OF PROSTHETIC HEART VALVE: Chronic | ICD-10-CM

## 2017-03-01 DIAGNOSIS — Z98.890 OTHER SPECIFIED POSTPROCEDURAL STATES: Chronic | ICD-10-CM

## 2017-03-01 DIAGNOSIS — I50.9 HEART FAILURE, UNSPECIFIED: ICD-10-CM

## 2017-03-01 DIAGNOSIS — J18.9 PNEUMONIA, UNSPECIFIED ORGANISM: ICD-10-CM

## 2017-03-01 DIAGNOSIS — E87.1 HYPO-OSMOLALITY AND HYPONATREMIA: ICD-10-CM

## 2017-03-01 DIAGNOSIS — Z95.1 PRESENCE OF AORTOCORONARY BYPASS GRAFT: Chronic | ICD-10-CM

## 2017-03-01 DIAGNOSIS — R07.9 CHEST PAIN, UNSPECIFIED: ICD-10-CM

## 2017-03-01 DIAGNOSIS — L89.152 PRESSURE ULCER OF SACRAL REGION, STAGE 2: ICD-10-CM

## 2017-03-01 DIAGNOSIS — I10 ESSENTIAL (PRIMARY) HYPERTENSION: ICD-10-CM

## 2017-03-01 DIAGNOSIS — J18.1 LOBAR PNEUMONIA, UNSPECIFIED ORGANISM: ICD-10-CM

## 2017-03-01 DIAGNOSIS — J44.9 CHRONIC OBSTRUCTIVE PULMONARY DISEASE, UNSPECIFIED: ICD-10-CM

## 2017-03-01 DIAGNOSIS — I71.4 ABDOMINAL AORTIC ANEURYSM, WITHOUT RUPTURE: ICD-10-CM

## 2017-03-01 PROBLEM — I95.9 HYPOTENSION, UNSPECIFIED: Chronic | Status: ACTIVE | Noted: 2017-02-15

## 2017-03-01 PROBLEM — Z00.00 ENCOUNTER FOR PREVENTIVE HEALTH EXAMINATION: Status: ACTIVE | Noted: 2017-03-01

## 2017-03-01 LAB
ALBUMIN SERPL ELPH-MCNC: 2.4 G/DL — LOW (ref 3.3–5.2)
ALP SERPL-CCNC: 65 U/L — SIGNIFICANT CHANGE UP (ref 40–120)
ALT FLD-CCNC: 9 U/L — SIGNIFICANT CHANGE UP
ANION GAP SERPL CALC-SCNC: 8 MMOL/L — SIGNIFICANT CHANGE UP (ref 5–17)
APTT BLD: 32.7 SEC — SIGNIFICANT CHANGE UP (ref 27.5–37.4)
AST SERPL-CCNC: 13 U/L — SIGNIFICANT CHANGE UP
BILIRUB SERPL-MCNC: 0.3 MG/DL — LOW (ref 0.4–2)
BUN SERPL-MCNC: 12 MG/DL — SIGNIFICANT CHANGE UP (ref 8–20)
CALCIUM SERPL-MCNC: 8.4 MG/DL — LOW (ref 8.6–10.2)
CHLORIDE SERPL-SCNC: 91 MMOL/L — LOW (ref 98–107)
CK SERPL-CCNC: 15 U/L — LOW (ref 25–170)
CO2 SERPL-SCNC: 30 MMOL/L — HIGH (ref 22–29)
CREAT SERPL-MCNC: 0.53 MG/DL — SIGNIFICANT CHANGE UP (ref 0.5–1.3)
DIR ANTIGLOB POLYSPECIFIC INTERPRETATION: SIGNIFICANT CHANGE UP
EOSINOPHIL # BLD AUTO: 0.1 K/UL — SIGNIFICANT CHANGE UP (ref 0–0.5)
EOSINOPHIL NFR BLD AUTO: 2 % — SIGNIFICANT CHANGE UP (ref 0–5)
GLUCOSE SERPL-MCNC: 102 MG/DL — SIGNIFICANT CHANGE UP (ref 70–115)
HCT VFR BLD CALC: 26.3 % — LOW (ref 37–47)
HGB BLD-MCNC: 8.4 G/DL — LOW (ref 12–16)
INR BLD: 1.4 RATIO — HIGH (ref 0.88–1.16)
LYMPHOCYTES # BLD AUTO: 0.6 K/UL — LOW (ref 1–4.8)
LYMPHOCYTES # BLD AUTO: 8 % — LOW (ref 20–55)
MCHC RBC-ENTMCNC: 28.5 PG — SIGNIFICANT CHANGE UP (ref 27–31)
MCHC RBC-ENTMCNC: 31.9 G/DL — LOW (ref 32–36)
MCV RBC AUTO: 89.2 FL — SIGNIFICANT CHANGE UP (ref 81–99)
MONOCYTES # BLD AUTO: 0.8 K/UL — SIGNIFICANT CHANGE UP (ref 0–0.8)
MONOCYTES NFR BLD AUTO: 8 % — SIGNIFICANT CHANGE UP (ref 3–10)
NEUTROPHILS # BLD AUTO: 7 K/UL — SIGNIFICANT CHANGE UP (ref 1.8–8)
NEUTROPHILS NFR BLD AUTO: 80 % — HIGH (ref 37–73)
PLATELET # BLD AUTO: 314 K/UL — SIGNIFICANT CHANGE UP (ref 150–400)
POTASSIUM SERPL-MCNC: 5 MMOL/L — SIGNIFICANT CHANGE UP (ref 3.5–5.3)
POTASSIUM SERPL-SCNC: 5 MMOL/L — SIGNIFICANT CHANGE UP (ref 3.5–5.3)
PROT SERPL-MCNC: 7 G/DL — SIGNIFICANT CHANGE UP (ref 6.6–8.7)
PROTHROM AB SERPL-ACNC: 15.5 SEC — HIGH (ref 10–13.1)
RBC # BLD: 2.95 M/UL — LOW (ref 4.4–5.2)
RBC # FLD: 15.6 % — SIGNIFICANT CHANGE UP (ref 11–15.6)
SODIUM SERPL-SCNC: 129 MMOL/L — LOW (ref 135–145)
TROPONIN T SERPL-MCNC: <0.01 NG/ML — SIGNIFICANT CHANGE UP (ref 0–0.06)
TYPE + AB SCN PNL BLD: SIGNIFICANT CHANGE UP
WBC # BLD: 8.7 K/UL — SIGNIFICANT CHANGE UP (ref 4.8–10.8)
WBC # FLD AUTO: 8.7 K/UL — SIGNIFICANT CHANGE UP (ref 4.8–10.8)

## 2017-03-01 PROCEDURE — 71010: CPT | Mod: 26

## 2017-03-01 PROCEDURE — 99285 EMERGENCY DEPT VISIT HI MDM: CPT

## 2017-03-01 PROCEDURE — 93010 ELECTROCARDIOGRAM REPORT: CPT

## 2017-03-01 PROCEDURE — 99223 1ST HOSP IP/OBS HIGH 75: CPT

## 2017-03-01 RX ORDER — FLUTICASONE PROPIONATE AND SALMETEROL 50; 250 UG/1; UG/1
1 POWDER ORAL; RESPIRATORY (INHALATION)
Qty: 0 | Refills: 0 | COMMUNITY

## 2017-03-01 RX ORDER — BUDESONIDE AND FORMOTEROL FUMARATE DIHYDRATE 160; 4.5 UG/1; UG/1
1 AEROSOL RESPIRATORY (INHALATION)
Qty: 0 | Refills: 0 | Status: DISCONTINUED | OUTPATIENT
Start: 2017-03-01 | End: 2017-03-08

## 2017-03-01 RX ORDER — MAGNESIUM HYDROXIDE 400 MG/1
30 TABLET, CHEWABLE ORAL DAILY
Qty: 0 | Refills: 0 | Status: DISCONTINUED | OUTPATIENT
Start: 2017-03-01 | End: 2017-03-08

## 2017-03-01 RX ORDER — GABAPENTIN 400 MG/1
1 CAPSULE ORAL
Qty: 0 | Refills: 0 | COMMUNITY

## 2017-03-01 RX ORDER — DIGOXIN 250 MCG
1 TABLET ORAL
Qty: 0 | Refills: 0 | COMMUNITY

## 2017-03-01 RX ORDER — ATORVASTATIN CALCIUM 80 MG/1
1 TABLET, FILM COATED ORAL
Qty: 0 | Refills: 0 | COMMUNITY

## 2017-03-01 RX ORDER — CARVEDILOL PHOSPHATE 80 MG/1
6.25 CAPSULE, EXTENDED RELEASE ORAL EVERY 12 HOURS
Qty: 0 | Refills: 0 | Status: DISCONTINUED | OUTPATIENT
Start: 2017-03-01 | End: 2017-03-06

## 2017-03-01 RX ORDER — TRAMADOL HYDROCHLORIDE 50 MG/1
50 TABLET ORAL
Qty: 0 | Refills: 0 | Status: DISCONTINUED | OUTPATIENT
Start: 2017-03-01 | End: 2017-03-07

## 2017-03-01 RX ORDER — ZOLPIDEM TARTRATE 10 MG/1
5 TABLET ORAL AT BEDTIME
Qty: 0 | Refills: 0 | Status: DISCONTINUED | OUTPATIENT
Start: 2017-03-01 | End: 2017-03-07

## 2017-03-01 RX ORDER — ACETAMINOPHEN 500 MG
650 TABLET ORAL EVERY 6 HOURS
Qty: 0 | Refills: 0 | Status: DISCONTINUED | OUTPATIENT
Start: 2017-03-01 | End: 2017-03-08

## 2017-03-01 RX ORDER — LEVOTHYROXINE SODIUM 125 MCG
112 TABLET ORAL DAILY
Qty: 0 | Refills: 0 | Status: DISCONTINUED | OUTPATIENT
Start: 2017-03-01 | End: 2017-03-08

## 2017-03-01 RX ORDER — DOCUSATE SODIUM 100 MG
100 CAPSULE ORAL DAILY
Qty: 0 | Refills: 0 | Status: DISCONTINUED | OUTPATIENT
Start: 2017-03-01 | End: 2017-03-08

## 2017-03-01 RX ORDER — GABAPENTIN 400 MG/1
100 CAPSULE ORAL
Qty: 0 | Refills: 0 | Status: DISCONTINUED | OUTPATIENT
Start: 2017-03-01 | End: 2017-03-08

## 2017-03-01 RX ORDER — DIGOXIN 250 MCG
0.12 TABLET ORAL DAILY
Qty: 0 | Refills: 0 | Status: DISCONTINUED | OUTPATIENT
Start: 2017-03-01 | End: 2017-03-08

## 2017-03-01 RX ORDER — FUROSEMIDE 40 MG
20 TABLET ORAL DAILY
Qty: 0 | Refills: 0 | Status: DISCONTINUED | OUTPATIENT
Start: 2017-03-01 | End: 2017-03-02

## 2017-03-01 RX ORDER — LEVOTHYROXINE SODIUM 125 MCG
1 TABLET ORAL
Qty: 0 | Refills: 0 | COMMUNITY

## 2017-03-01 RX ORDER — NITROGLYCERIN 6.5 MG
0.3 CAPSULE, EXTENDED RELEASE ORAL
Qty: 0 | Refills: 0 | Status: DISCONTINUED | OUTPATIENT
Start: 2017-03-01 | End: 2017-03-08

## 2017-03-01 RX ORDER — POTASSIUM CHLORIDE 20 MEQ
10 PACKET (EA) ORAL DAILY
Qty: 0 | Refills: 0 | Status: DISCONTINUED | OUTPATIENT
Start: 2017-03-01 | End: 2017-03-06

## 2017-03-01 RX ORDER — AMLODIPINE BESYLATE 2.5 MG/1
10 TABLET ORAL DAILY
Qty: 0 | Refills: 0 | Status: DISCONTINUED | OUTPATIENT
Start: 2017-03-01 | End: 2017-03-02

## 2017-03-01 RX ORDER — ATORVASTATIN CALCIUM 80 MG/1
10 TABLET, FILM COATED ORAL AT BEDTIME
Qty: 0 | Refills: 0 | Status: DISCONTINUED | OUTPATIENT
Start: 2017-03-01 | End: 2017-03-08

## 2017-03-01 RX ORDER — ZOLPIDEM TARTRATE 10 MG/1
1 TABLET ORAL
Qty: 0 | Refills: 0 | COMMUNITY

## 2017-03-01 RX ORDER — ASPIRIN/CALCIUM CARB/MAGNESIUM 324 MG
81 TABLET ORAL DAILY
Qty: 0 | Refills: 0 | Status: DISCONTINUED | OUTPATIENT
Start: 2017-03-01 | End: 2017-03-08

## 2017-03-01 NOTE — ED PROVIDER NOTE - PROGRESS NOTE DETAILS
CT surgery called and made aware. CT surgery has evaluated patient and states that no surgical intervention is needed. An attempt was made to admit patient for PNA to hospitalist.  Hospitalist refused to admit.  Dr. Eaton called and made aware. Dr. Eaton states that Dr. Bartlett needs to come to see the patient first and at least right a consult if he has a provider is uncomfortable admitting the patient.  If necessary it will be escalated in the morning. seen by  will admit

## 2017-03-01 NOTE — ED ADULT NURSE NOTE - CHIEF COMPLAINT QUOTE
Patient MARIANNE,sent from [facility], patient complains of chest pain, was given 3 nitro sublingual by nursing home with relief, EMS gave 324 ASA PO.  On o2 at nursing home 2LPM NC

## 2017-03-01 NOTE — H&P ADULT - HISTORY OF PRESENT ILLNESS
77 y/o female with h/o Abdominal and thoracic aortic aneurysm repaire with leakage, was referred from [facility] because of left side chest pain. pain is sharp on and off for more than 2 weeks. no clear precipitating factors. pain is not related to breathing, eating, movements or effort. pain is not associated with nausea or diaphoresis. pain may last < 5 min. CTA chest and abdomen showed no significant change form prior study 2 weeks ago. chest CT shows right upper lobe pneumonia. . patient has occasional cough with whitish sputum. Hgb dropped 3 grams since 2 weeks ago. patient denies any blood in the stools or urine

## 2017-03-01 NOTE — CONSULT NOTE ADULT - ASSESSMENT
A/P: Patient is a frail 71 year old female with multiple co-morbidities s/p AVR at [place] and s/p repair of her dissection who presents with mild to moderate chest pain from rehab.  Patient's CTA of chest is stable, but reveals a possible RUL infiltrate.

## 2017-03-01 NOTE — ED PROVIDER NOTE - MUSCULOSKELETAL, MLM
Spine appears normal, range of motion is not limited, no muscle or joint tenderness.  No chest wall tenderness.

## 2017-03-01 NOTE — CONSULT NOTE ADULT - SUBJECTIVE AND OBJECTIVE BOX
CT Surgery Consult Note:    MAYNOR CHANEY is a 76y Female recently admitted to [facility], treated for sepsis and chronic anemia and was discharged to rehab.  Patient was at rehab and per the patient's family worked with physical therapy and was "up for the first time in months".   Patient was sent from rehab due to chest pain this morning around 10am.  Patient states that she experiences chest pain at baseline occasionally, her pain is presently not present but comes and goes, the pain does not radiate and localizes to the left upper quadrant of the abdomen. Patient underwent CTA of chest and abdomen which revealed her chronic dissection, and as per radiology, is unchanged.  However there appears to have a RUL infiltrate on CT scan.    Subjective:  Patient has no complaints.        REVIEW OF SYSTEMS    General:	Lethargic recently, worked with PT yesterday.    ENMT:              Denies  Respiratory and Thorax:  Denies shortness of breath.  Productive cough per family.  Denies aspiration  Cardiovascular:  s/p open heart surgery in december.  admits to mild chest pain 5/10 this Am which comes and goes, as above.  Gastrointestinal: Patient reports a 30lb weight loss and poor PO intake since her cardiac surgery, denies changes in stool character or frequency, denies bloody Bms.  Neurological:	Admits to lethargy  Hematology/Lymphatics:	Chronically anemic  Endocrine:	Denies  Allergic/Immunologic:	Denies    Objective:   Vital Signs Last 24 Hrs  T(C): 36.8, Max: 36.8 (03-01 @ 10:43)  T(F): 98.3, Max: 98.3 (03-01 @ 10:43)  HR: 63 (63 - 63)  BP: 101/50 (101/50 - 101/50)  RR: 18 (18 - 18)  SpO2: 94% (94% - 94%)    PHYSICAL EXAM:      Constitutional: Frail appearing, no acute distress, breathing nonlabored  Eyes: EOMI intact  ENMT: Neck supple, trachea midline  Respiratory: Bilateral rhonchi, clears with cough  Cardiovascular:  S1 S2, no murmurs appreciated on exam.  Palpation of patient's chest, left sided at 5th intercostal space produces tenderness  Gastrointestinal: nontender, nondistended abdomen  Extremities:  strength intact, all extremities antigravity  Neurological: Awake alert, conversational, nonfocal moves all extremities equally and antigravity            MEDICATIONS  (STANDING):  levoFLOXacin IVPB 750milliGRAM(s) IV Intermittent once    MEDICATIONS  (PRN):      CXR No acute cardiopulmonary disease process. Stable appearing   aortic knob aneurysm/dissection. CTA is recommended for evaluation.    CT Scan   Stable post ascending aortic graft type I be distal endoleak with   contrast extravasating into the false lumen essentially unchanged from   prior examination dated 2/15/2017. Left greater than right pleural   effusions without pneumothorax. Increasing right upper lobe opacities are   concerning for infectious pneumonia. No other change has occurred.

## 2017-03-01 NOTE — CONSULT NOTE ADULT - PROBLEM SELECTOR RECOMMENDATION 2
RUL infiltrate on CT scan, antibiotic therapy per primary team.  Patient should be considered for a speech and swallow evaluation and further work up to see if she is aspirating.

## 2017-03-01 NOTE — ED ADULT NURSE NOTE - PMH
Abdominal aortic aneurysm    CHF (congestive heart failure)    COPD (chronic obstructive pulmonary disease)    Coronary artery disease involving native coronary artery of native heart without angina pectoris    DVT (deep venous thrombosis)    GERD (gastroesophageal reflux disease)    Hearing loss    Hyperlipidemia    Hypertension    Hypotension    Hypothyroidism, unspecified type    Orthostatic hypotension    Pleural effusion    Rheumatoid arthritis    Spinal stenosis    Syncope and collapse    Urine retention

## 2017-03-01 NOTE — ED PROVIDER NOTE - CARE PLAN
Principal Discharge DX:	Chest pain, unspecified type  Secondary Diagnosis:	Pneumonia due to infectious organism, unspecified laterality, unspecified part of lung

## 2017-03-01 NOTE — H&P ADULT - NSHPREVIEWOFSYSTEMS_GEN_ALL_CORE
General: no fever or chills. no h/o weight loss  HEENT: no headache. no blurry vision. no throat or ear pain.   Respiratory: minimal cough. no wheezing  Cardiovascular: left inframammary chest pain. no palpitations. no Dyspnia or lower ext. edema.  Gastrointestinal: no difficulty to swallow. no epigastric pain or acid reflux. no diarrhea or constipation. no blood or mucous in the stools.  Genitourinary: no dysuria or frequent urination. no blood in the urine  Skin: no rash or itch  Neuro: no focal weakness. no double vision. no focal tingling or numbness.

## 2017-03-01 NOTE — ED PROVIDER NOTE - OBJECTIVE STATEMENT
This patient is a 76 year old woman with a past medical history of A fib s/p TAVR, AAA repair, CAD s/p CABGx 1  recently discharged from [facility] 9 days ago where aortic dissection was discovered as inpatient who presents to the ED from NH for chest pain.  Patient reprots substernal chest pain that started yesterday and continued with more severity today.

## 2017-03-01 NOTE — H&P ADULT - NSHPPHYSICALEXAM_GEN_ALL_CORE
General: Well developed. well nourished. not in distress  HEENT: AT, NC. PERRL. intact EOM. no throat erythema or exudate.   Neck: supple. no JVD. no palpable lymph nodes  Chest: poor inspiratory effort. ocaasional scattered crackles B/l  Heart: normal S1,S2. RRR. systolic heart murmur.  Abdomen: soft. non-tender. non-distended. + BS. no hernia or palpable masses.  Genital: not examined  Ext: no C/C/E. no calf tenderness.   Neuro: AAO x3. no focal weakness. no speech disorder  Skin: no rash  pressure ulcer in the sacral area, dry and clean

## 2017-03-01 NOTE — ED ADULT TRIAGE NOTE - CHIEF COMPLAINT QUOTE
Patient MARIANNE,sent from [facility], patient complains of chest pain, was given 3 nitro sublingual by nursing home with relief, EMS gave 324 ASA PO.  On o2 at nursing home 2LPM NC

## 2017-03-01 NOTE — CONSULT NOTE ADULT - PROBLEM SELECTOR RECOMMENDATION 9
Patient requires a medical workup for her chest pain as her chronic dissection, which is unchanged is likely not the etiology.  BNP and D-Dimer ordered by CT surgery team and discussed with the ER.  Consider sending repeat cardiac enzymes, and further work up, evaluation and treatment of other causes of chest pain including but not limited to pneumonia, pulmonary embolism, ischemia, and musculoskeletal pain

## 2017-03-01 NOTE — CONSULT NOTE ADULT - PROBLEM SELECTOR RECOMMENDATION 5
Dissection stable on CTA, discussed with radiology.  Patient presently not a cadidate for open repair, and is a poor cadidate for TEVAR per Dr. Ding.

## 2017-03-01 NOTE — H&P ADULT - NSHPLABSRESULTS_GEN_ALL_CORE
8.4    8.7   )-----------( 314      ( 01 Mar 2017 12:48 )             26.3     01 Mar 2017 12:48    129    |  91     |  12.0   ----------------------------<  102    5.0     |  30.0   |  0.53     Ca    8.4        01 Mar 2017 12:48    TPro  7.0    /  Alb  2.4    /  TBili  0.3    /  DBili  x      /  AST  13     /  ALT  9      /  AlkPhos  65     01 Mar 2017 12:48    CARDIAC MARKERS ( 01 Mar 2017 12:48 )  x     / <0.01 ng/mL / 15 U/L / x     / x              PT/INR - ( 01 Mar 2017 12:48 )   PT: 15.5 sec;   INR: 1.40 ratio         PTT - ( 01 Mar 2017 12:48 )  PTT:32.7 sec

## 2017-03-02 ENCOUNTER — TRANSCRIPTION ENCOUNTER (OUTPATIENT)
Age: 77
End: 2017-03-02

## 2017-03-02 DIAGNOSIS — D64.9 ANEMIA, UNSPECIFIED: ICD-10-CM

## 2017-03-02 DIAGNOSIS — R07.9 CHEST PAIN, UNSPECIFIED: ICD-10-CM

## 2017-03-02 DIAGNOSIS — J90 PLEURAL EFFUSION, NOT ELSEWHERE CLASSIFIED: ICD-10-CM

## 2017-03-02 DIAGNOSIS — I71.3 ABDOMINAL AORTIC ANEURYSM, RUPTURED: ICD-10-CM

## 2017-03-02 LAB
CK SERPL-CCNC: 11 U/L — LOW (ref 25–170)
D DIMER BLD IA.RAPID-MCNC: 3635 NG/ML DDU — HIGH
NT-PROBNP SERPL-SCNC: 3089 PG/ML — HIGH (ref 0–300)
TROPONIN T SERPL-MCNC: <0.01 NG/ML — SIGNIFICANT CHANGE UP (ref 0–0.06)

## 2017-03-02 PROCEDURE — 99233 SBSQ HOSP IP/OBS HIGH 50: CPT

## 2017-03-02 PROCEDURE — 99222 1ST HOSP IP/OBS MODERATE 55: CPT

## 2017-03-02 RX ORDER — AMLODIPINE BESYLATE 2.5 MG/1
5 TABLET ORAL DAILY
Qty: 0 | Refills: 0 | Status: DISCONTINUED | OUTPATIENT
Start: 2017-03-02 | End: 2017-03-07

## 2017-03-02 RX ORDER — DOCUSATE SODIUM 100 MG
1 CAPSULE ORAL
Qty: 0 | Refills: 0 | COMMUNITY

## 2017-03-02 RX ORDER — FUROSEMIDE 40 MG
40 TABLET ORAL DAILY
Qty: 0 | Refills: 0 | Status: DISCONTINUED | OUTPATIENT
Start: 2017-03-02 | End: 2017-03-06

## 2017-03-02 RX ORDER — NITROGLYCERIN 6.5 MG
1 CAPSULE, EXTENDED RELEASE ORAL
Qty: 0 | Refills: 0 | COMMUNITY

## 2017-03-02 RX ORDER — ISOSORBIDE MONONITRATE 60 MG/1
30 TABLET, EXTENDED RELEASE ORAL DAILY
Qty: 0 | Refills: 0 | Status: DISCONTINUED | OUTPATIENT
Start: 2017-03-02 | End: 2017-03-08

## 2017-03-02 RX ORDER — MEROPENEM 1 G/30ML
500 INJECTION INTRAVENOUS EVERY 8 HOURS
Qty: 0 | Refills: 0 | Status: DISCONTINUED | OUTPATIENT
Start: 2017-03-02 | End: 2017-03-06

## 2017-03-02 RX ORDER — TRAMADOL HYDROCHLORIDE 50 MG/1
0 TABLET ORAL
Qty: 0 | Refills: 0 | COMMUNITY

## 2017-03-02 RX ORDER — MAGNESIUM HYDROXIDE 400 MG/1
0 TABLET, CHEWABLE ORAL
Qty: 0 | Refills: 0 | COMMUNITY

## 2017-03-02 RX ORDER — LACTOBACILLUS ACIDOPHILUS 100MM CELL
1 CAPSULE ORAL DAILY
Qty: 0 | Refills: 0 | Status: DISCONTINUED | OUTPATIENT
Start: 2017-03-02 | End: 2017-03-08

## 2017-03-02 RX ORDER — ACETAMINOPHEN 500 MG
2 TABLET ORAL
Qty: 0 | Refills: 0 | COMMUNITY

## 2017-03-02 RX ADMIN — CARVEDILOL PHOSPHATE 6.25 MILLIGRAM(S): 80 CAPSULE, EXTENDED RELEASE ORAL at 05:27

## 2017-03-02 RX ADMIN — AMLODIPINE BESYLATE 10 MILLIGRAM(S): 2.5 TABLET ORAL at 05:27

## 2017-03-02 RX ADMIN — ATORVASTATIN CALCIUM 10 MILLIGRAM(S): 80 TABLET, FILM COATED ORAL at 21:24

## 2017-03-02 RX ADMIN — Medication 650 MILLIGRAM(S): at 00:53

## 2017-03-02 RX ADMIN — Medication 112 MICROGRAM(S): at 05:28

## 2017-03-02 RX ADMIN — MEROPENEM 200 MILLIGRAM(S): 1 INJECTION INTRAVENOUS at 15:17

## 2017-03-02 RX ADMIN — Medication 10 MILLIEQUIVALENT(S): at 13:01

## 2017-03-02 RX ADMIN — CARVEDILOL PHOSPHATE 6.25 MILLIGRAM(S): 80 CAPSULE, EXTENDED RELEASE ORAL at 18:25

## 2017-03-02 RX ADMIN — Medication 0.12 MILLIGRAM(S): at 05:27

## 2017-03-02 RX ADMIN — GABAPENTIN 100 MILLIGRAM(S): 400 CAPSULE ORAL at 05:27

## 2017-03-02 RX ADMIN — Medication 100 MILLIGRAM(S): at 13:01

## 2017-03-02 RX ADMIN — Medication 20 MILLIGRAM(S): at 05:28

## 2017-03-02 RX ADMIN — MEROPENEM 200 MILLIGRAM(S): 1 INJECTION INTRAVENOUS at 21:24

## 2017-03-02 RX ADMIN — Medication 81 MILLIGRAM(S): at 13:01

## 2017-03-02 RX ADMIN — GABAPENTIN 100 MILLIGRAM(S): 400 CAPSULE ORAL at 18:25

## 2017-03-02 RX ADMIN — Medication 1 TABLET(S): at 13:01

## 2017-03-02 RX ADMIN — TRAMADOL HYDROCHLORIDE 50 MILLIGRAM(S): 50 TABLET ORAL at 07:46

## 2017-03-02 RX ADMIN — BUDESONIDE AND FORMOTEROL FUMARATE DIHYDRATE 1 PUFF(S): 160; 4.5 AEROSOL RESPIRATORY (INHALATION) at 11:03

## 2017-03-02 RX ADMIN — BUDESONIDE AND FORMOTEROL FUMARATE DIHYDRATE 1 PUFF(S): 160; 4.5 AEROSOL RESPIRATORY (INHALATION) at 20:14

## 2017-03-02 RX ADMIN — Medication 650 MILLIGRAM(S): at 01:03

## 2017-03-02 RX ADMIN — TRAMADOL HYDROCHLORIDE 50 MILLIGRAM(S): 50 TABLET ORAL at 05:28

## 2017-03-02 NOTE — PROGRESS NOTE ADULT - PROBLEM SELECTOR PLAN 2
pt currently on lasix po   ECHO recently done  called [facility] for re- eval

## 2017-03-02 NOTE — CONSULT NOTE ADULT - SUBJECTIVE AND OBJECTIVE BOX
[facility] [place] TAL COREA MD FACP   RAJWINDER JOHNSON MD  Diplomates American Board of Internal Medicine and Infecctious Diseases  [phone]  [phone] ALAINA [id number]      HPI:  75 y/o female with h/o Abdominal and thoracic aortic aneurysm repaire with leakage, was referred from [facility] because of left side chest pain. pain is sharp on and off for more than 2 weeks. no clear precipitating factors. pain is not related to breathing, eating, movements or effort. pain is not associated with nausea or diaphoresis. pain may last < 5 min. CTA chest and abdomen showed no significant change form prior study 2 weeks ago. chest CT shows right upper lobe pneumonia. . patient has occasional cough with whitish sputum. Hgb dropped 3 grams since 2 weeks ago. patient denies any blood in the stools or urine   PT WITH PROBABLE PNEUMONIA ASKED TO EVALUATE FROM ID STANDPOINT       PAST MEDICAL & SURGICAL HISTORY:  DVT (deep venous thrombosis)  Rheumatoid arthritis  COPD (chronic obstructive pulmonary disease)  Hyperlipidemia  Hypertension  Spinal stenosis  Hearing loss  Urine retention  GERD (gastroesophageal reflux disease)  Orthostatic hypotension  Abdominal aortic aneurysm  Pleural effusion  Syncope and collapse  Hypothyroidism, unspecified type  Coronary artery disease involving native coronary artery of native heart without angina pectoris  CHF (congestive heart failure)  Hypotension  S/P AVR  S/P CABG x 1  S/P AAA repair  No significant past surgical history      ANTIBIOTICS  levoFLOXacin IVPB 750milliGRAM(s) IV Intermittent every 24 hours      Allergies    adhesives (Unknown)  Codeine Phosphate (Other)  ferrous sulfate (Other)  penicillin (Other)    Intolerances        SOCIAL HISTORY:    FAMILY HISTORY:  No pertinent family history in first degree relatives      Vital Signs Last 24 Hrs  T(C): 36.6, Max: 36.8 (03-01 @ 10:43)  T(F): 97.9, Max: 98.3 (03-01 @ 10:43)  HR: 71 (63 - 79)  BP: 132/68 (101/50 - 140/70)  BP(mean): --  RR: 17 (17 - 18)  SpO2: 95% (94% - 95%)  Drug Dosing Weight  Height (cm): 162.6 (01 Mar 2017 10:43)  Weight (kg): 49.9 (01 Mar 2017 10:43)  BMI (kg/m2): 18.9 (01 Mar 2017 10:43)  BSA (m2): 1.52 (01 Mar 2017 10:43)      REVIEW OF SYSTEMS:    CONSTITUTIONAL:  As per HPI.    HEENT:  Eyes:  No diplopia or blurred vision. ENT:  No earache, sore throat or runny nose.    CARDIOVASCULAR:  No pressure, squeezing, strangling, tightness, heaviness or aching about the chest, neck, axilla or epigastrium.    RESPIRATORY:  No cough, shortness of breath, PND or orthopnea.    GASTROINTESTINAL:  No nausea, vomiting or diarrhea.    GENITOURINARY:  No dysuria, frequency or urgency.    MUSCULOSKELETAL:  As per HPI.    SKIN:  No change in skin, hair or nails.    NEUROLOGIC:  No paresthesias, fasciculations, seizures or weakness.                  PHYSICAL EXAMINATION:    GENERAL: The patient is a well-developed, well-nourished IN NAD.    VITAL SIGNS: T(C): 36.6, Max: 36.8 (03-01 @ 10:43)  HR: 71 (63 - 79)  BP: 132/68 (101/50 - 140/70)  RR: 17 (17 - 18)  SpO2: 95% (94% - 95%)  Wt(kg): --    HEENT: Head is normocephalic and atraumatic.  ANICTERIC  NECK: Supple. No carotid bruits.  No lymphadenopathy or thyromegaly.    LUNGS:COARSE BREATH SOUNDS    HEART: Regular rate and rhythm without murmur.    ABDOMEN: Soft, nontender, and nondistended.  Positive bowel sounds.  No hepatosplenomegaly was noted. NO REBOUND NO GUARDING    EXTREMITIES: NO EDEMA NO ERYTHEMA    NEUROLOGIC: NON FOCAL      SKIN: No ulceration or induration present. NO RASH        BLOOD CULTURES       URINE CX          LABS:                        8.4    8.7   )-----------( 314      ( 01 Mar 2017 12:48 )             26.3     01 Mar 2017 12:48    129    |  91     |  12.0   ----------------------------<  102    5.0     |  30.0   |  0.53     Ca    8.4        01 Mar 2017 12:48    TPro  7.0    /  Alb  2.4    /  TBili  0.3    /  DBili  x      /  AST  13     /  ALT  9      /  AlkPhos  65     01 Mar 2017 12:48    PT/INR - ( 01 Mar 2017 12:48 )   PT: 15.5 sec;   INR: 1.40 ratio         PTT - ( 01 Mar 2017 12:48 )  PTT:32.7 sec      RADIOLOGY & ADDITIONAL STUDIES:      ASSESSMENT/PLAN  77YO FEMALE HX OF AORTIC DISSECTION   IN [facility] REHAB RECENT HOSPITAL STAY AT [facility] NOW RETURNS WITH INCREASED SOB  CT SCANW ITH UPPERM LOBE PNEUMONIA   PT WITH REPORTED PCN ALLERGY WITH RASH  WILL D/C LEVAQUIN CONCERN FOR HOSPITAL ACQUIRED ORGANISMS WILL RX MERREM  SHOULD TOLERATE WILL FOLLOW UP                RAJWINDER AGUILLON MD

## 2017-03-02 NOTE — DISCHARGE NOTE ADULT - CARE PLAN
Principal Discharge DX:	Pneumonia due to infectious organism, unspecified laterality, unspecified part of lung  Secondary Diagnosis:	Pleural effusion  Secondary Diagnosis:	Essential hypertension  Secondary Diagnosis:	CHF (congestive heart failure)  Secondary Diagnosis:	Abdominal aortic aneurysm (AAA) without rupture Principal Discharge DX:	Pneumonia due to infectious organism, unspecified laterality, unspecified part of lung  Goal:	take abx for 3 more days  Instructions for follow-up, activity and diet:	low salt low fat diet  Secondary Diagnosis:	Pleural effusion  Secondary Diagnosis:	Essential hypertension  Secondary Diagnosis:	CHF (congestive heart failure)  Secondary Diagnosis:	Abdominal aortic aneurysm (AAA) without rupture

## 2017-03-02 NOTE — DISCHARGE NOTE ADULT - HOSPITAL COURSE
77 y/o female with h/o Abdominal and thoracic aortic aneurysm repaire with leakage, was referred from [facility] because of left side chest pain. pain is sharp on and off for more than 2 weeks. no clear precipitating factors. pain is not related to breathing, eating, movements or effort. pain is not associated with nausea or diaphoresis. pain may last < 5 min. CTA chest and abdomen showed no significant change form prior study 2 weeks ago. chest CT shows right upper lobe pneumonia. . patient has occasional cough with whitish sputum. Hgb dropped 3 grams since 2 weeks ago. patient denies any blood in the stools or urine 75 y/o female with h/o Abdominal and thoracic aortic aneurysm repaire with leakage, was referred from [facility] because of left side chest pain. pain is sharp on and off for more than 2 weeks. no clear precipitating factors. pain is not related to breathing, eating, movements or effort. pain is not associated with nausea or diaphoresis. pain may last < 5 min. CTA chest and abdomen showed no significant change form prior study 2 weeks ago. chest CT shows right upper lobe pneumonia. . patient has occasional cough with whitish sputum. Hgb dropped 3 grams since 2 weeks ago. patient denies any blood in the stools or urine  Abx were finished during the hospital course   pt became orthostatic and BP meds were adjusted

## 2017-03-02 NOTE — DISCHARGE NOTE ADULT - SECONDARY DIAGNOSIS.
Pleural effusion Essential hypertension CHF (congestive heart failure) Abdominal aortic aneurysm (AAA) without rupture

## 2017-03-02 NOTE — DISCHARGE NOTE ADULT - MEDICATION SUMMARY - MEDICATIONS TO STOP TAKING
I will STOP taking the medications listed below when I get home from the hospital:    Fleet Enema 7 g-19 g rectal enema  -- 133 milliliter(s) rectally once, As Needed    quaifenesin  -- 10 milliliter(s) by mouth every 6 hours, As Needed    hi-eloisa  -- 120 milliliter(s)  3 times a day    pro-stat  --  by mouth 3 times a day  -- amino acids-protein hydrolys I will STOP taking the medications listed below when I get home from the hospital:    Lasix 20 mg oral tablet  -- 1 tab(s) by mouth once a day    potassium chloride 10 mEq oral capsule, extended release  -- 1 tab(s) by mouth once a day with lasix    Coreg 6.25 mg oral tablet  -- 1 tab(s) by mouth 2 times a day    Fleet Enema 7 g-19 g rectal enema  -- 133 milliliter(s) rectally once, As Needed    quaifenesin  -- 10 milliliter(s) by mouth every 6 hours, As Needed    hi-eloisa  -- 120 milliliter(s)  3 times a day    pro-stat  --  by mouth 3 times a day  -- amino acids-protein hydrolys    Levaquin 500 mg oral tablet  -- 1 tab(s) by mouth every 24 hours for 3 more days

## 2017-03-02 NOTE — CONSULT NOTE ADULT - SUBJECTIVE AND OBJECTIVE BOX
[facility], THE HEART CENTER                                   [address] [place] [address]                                                      PHONE: [phone]                                                         FAX: [phone]  [email or web address]  ---------------------------------------------------------------------------------------------------------------------------------    This patient is a 76 year old woman with a past medical history of A fib s/p TAVR, AAA repair, CAD s/p CABGx 1  recently discharged from [facility] 9 days ago where aortic dissection was discovered as inpatient who presents to the ED from NH for chest pain.     PAST MEDICAL & SURGICAL HISTORY:  DVT (deep venous thrombosis)  Rheumatoid arthritis  COPD (chronic obstructive pulmonary disease)  Hyperlipidemia  Hypertension  Spinal stenosis  Hearing loss  Urine retention  GERD (gastroesophageal reflux disease)  Orthostatic hypotension  Abdominal aortic aneurysm  Pleural effusion  Syncope and collapse  Hypothyroidism, unspecified type  Coronary artery disease involving native coronary artery of native heart without angina pectoris  CHF (congestive heart failure)  Hypotension  S/P AVR  S/P CABG x 1  S/P AAA repair  No significant past surgical history      adhesives (Unknown)  Codeine Phosphate (Other)  ferrous sulfate (Other)  penicillin (Other)      MEDICATIONS  (STANDING):  aspirin enteric coated 81milliGRAM(s) Oral daily  digoxin     Tablet 0.125milliGRAM(s) Oral daily  gabapentin 100milliGRAM(s) Oral two times a day  atorvastatin 10milliGRAM(s) Oral at bedtime  carvedilol 6.25milliGRAM(s) Oral every 12 hours  buDESOnide 160 MICROgram(s)/formoterol 4.5 MICROgram(s) Inhaler 1Puff(s) Inhalation two times a day  amLODIPine   Tablet 10milliGRAM(s) Oral daily  furosemide    Tablet 20milliGRAM(s) Oral daily  docusate sodium 100milliGRAM(s) Oral daily  potassium chloride    Tablet ER 10milliEquivalent(s) Oral daily  levothyroxine 112MICROGram(s) Oral daily  levoFLOXacin IVPB 750milliGRAM(s) IV Intermittent every 24 hours  lactobacillus acidophilus 1Tablet(s) Oral daily    MEDICATIONS  (PRN):  acetaminophen   Tablet. 650milliGRAM(s) Oral every 6 hours PRN Mild Pain (1 - 3)  traMADol 50milliGRAM(s) Oral two times a day PRN Moderate Pain (4 - 6)  magnesium hydroxide Suspension 30milliLiter(s) Oral daily PRN Constipation  nitroglycerin     SubLingual 0.3milliGRAM(s) SubLingual every 5 minutes PRN Chest Pain  zolpidem 5milliGRAM(s) Oral at bedtime PRN Insomnia      Social History:  No smoking   No alcohol  No     ROS: Negative other than as mentioned in HPI.    Vital Signs Last 24 Hrs  T(C): 36.6, Max: 36.8 (03-01 @ 10:43)  T(F): 97.9, Max: 98.3 (03-01 @ 10:43)  HR: 71 (63 - 79)  BP: 132/68 (101/50 - 140/70)  BP(mean): --  RR: 17 (17 - 18)  SpO2: 95% (94% - 95%)  ICU Vital Signs Last 24 Hrs  MAYNOR CHANEY  I&O's Detail    I & Os for current day (as of 02 Mar 2017 09:40)  =============================================  IN:    Oral Fluid: 120 ml    Total IN: 120 ml  ---------------------------------------------  OUT:    Total OUT: 0 ml  ---------------------------------------------  Total NET: 120 ml    I&O's Summary    I & Os for current day (as of 02 Mar 2017 09:40)  =============================================  IN: 120 ml / OUT: 0 ml / NET: 120 ml    Drug Dosing Weight  MAYNOR CHANEY      PHYSICAL EXAM:    HEENT:  No JVD  CARDIOVASCULAR: Normal S1 and S2, No murmur, rub, lift.   LUNGS: No rales, rhonchi or wheeze. Normal breath sounds bilaterally.  ABDOMEN: Soft, nontender without mass or organomegaly. bowel sounds normoactive.  EXTREMITIES: No clubbing, cyanosis or edema          LABS:                        8.4    8.7   )-----------( 314      ( 01 Mar 2017 12:48 )             26.3     01 Mar 2017 12:48    129    |  91     |  12.0   ----------------------------<  102    5.0     |  30.0   |  0.53     Ca    8.4        01 Mar 2017 12:48    TPro  7.0    /  Alb  2.4    /  TBili  0.3    /  DBili  x      /  AST  13     /  ALT  9      /  AlkPhos  65     01 Mar 2017 12:48    MAYNOR CHANEY  CARDIAC MARKERS ( 02 Mar 2017 03:30 )  x     / <0.01 ng/mL / 11 U/L / x     / x      CARDIAC MARKERS ( 01 Mar 2017 12:48 )  x     / <0.01 ng/mL / 15 U/L / x     / x          PT/INR - ( 01 Mar 2017 12:48 )   PT: 15.5 sec;   INR: 1.40 ratio         PTT - ( 01 Mar 2017 12:48 )  PTT:32.7 sec      Assessment and Plan:  In summary, MAYNOR CHANEY is an 76y Female with past medical history significant for [facility], THE HEART CENTER                                   [address] [place] [address]                                                      PHONE: [phone]                                                         FAX: [phone]  [email or web address]  ---------------------------------------------------------------------------------------------------------------------------------    This patient is a 76 year old woman with a past medical history of A fib s/p TAVR, AAA repair, CAD s/p CABGx 1  recently discharged from [facility] 9 days ago where aortic dissection was discovered as inpatient who presents to the ED from NH for chest pain and sob. CP recurrent pressure like sensation.     PAST MEDICAL & SURGICAL HISTORY:  DVT (deep venous thrombosis)  Rheumatoid arthritis  COPD (chronic obstructive pulmonary disease)  Hyperlipidemia  Hypertension  Spinal stenosis  Hearing loss  Urine retention  GERD (gastroesophageal reflux disease)  Orthostatic hypotension  Abdominal aortic aneurysm  Pleural effusion  Syncope and collapse  Hypothyroidism, unspecified type  Coronary artery disease involving native coronary artery of native heart without angina pectoris  CHF (congestive heart failure)  Hypotension  S/P AVR  S/P CABG x 1  S/P AAA repair  No significant past surgical history      adhesives (Unknown)  Codeine Phosphate (Other)  ferrous sulfate (Other)  penicillin (Other)      MEDICATIONS  (STANDING):  aspirin enteric coated 81milliGRAM(s) Oral daily  digoxin     Tablet 0.125milliGRAM(s) Oral daily  gabapentin 100milliGRAM(s) Oral two times a day  atorvastatin 10milliGRAM(s) Oral at bedtime  carvedilol 6.25milliGRAM(s) Oral every 12 hours  buDESOnide 160 MICROgram(s)/formoterol 4.5 MICROgram(s) Inhaler 1Puff(s) Inhalation two times a day  amLODIPine   Tablet 10milliGRAM(s) Oral daily  furosemide    Tablet 20milliGRAM(s) Oral daily  docusate sodium 100milliGRAM(s) Oral daily  potassium chloride    Tablet ER 10milliEquivalent(s) Oral daily  levothyroxine 112MICROGram(s) Oral daily  levoFLOXacin IVPB 750milliGRAM(s) IV Intermittent every 24 hours  lactobacillus acidophilus 1Tablet(s) Oral daily    MEDICATIONS  (PRN):  acetaminophen   Tablet. 650milliGRAM(s) Oral every 6 hours PRN Mild Pain (1 - 3)  traMADol 50milliGRAM(s) Oral two times a day PRN Moderate Pain (4 - 6)  magnesium hydroxide Suspension 30milliLiter(s) Oral daily PRN Constipation  nitroglycerin     SubLingual 0.3milliGRAM(s) SubLingual every 5 minutes PRN Chest Pain  zolpidem 5milliGRAM(s) Oral at bedtime PRN Insomnia      Social History:  No smoking   No alcohol  No     ROS: Negative other than as mentioned in HPI.    Vital Signs Last 24 Hrs  T(C): 36.6, Max: 36.8 (03-01 @ 10:43)  T(F): 97.9, Max: 98.3 (03-01 @ 10:43)  HR: 71 (63 - 79)  BP: 132/68 (101/50 - 140/70)  BP(mean): --  RR: 17 (17 - 18)  SpO2: 95% (94% - 95%)  ICU Vital Signs Last 24 Hrs  MAYNOR CHANEY  I&O's Detail    I & Os for current day (as of 02 Mar 2017 09:40)  =============================================  IN:    Oral Fluid: 120 ml    Total IN: 120 ml  ---------------------------------------------  OUT:    Total OUT: 0 ml  ---------------------------------------------  Total NET: 120 ml    I&O's Summary    I & Os for current day (as of 02 Mar 2017 09:40)  =============================================  IN: 120 ml / OUT: 0 ml / NET: 120 ml    Drug Dosing Weight  MAYNOR CHURCHILLLLI      PHYSICAL EXAM:    HEENT:  No JVD  CARDIOVASCULAR: Normal S1 and S2, No murmur, rub, lift.   LUNGS: No rales, rhonchi or wheeze. Normal breath sounds bilaterally.  ABDOMEN: Soft, nontender without mass or organomegaly. bowel sounds normoactive.  EXTREMITIES: edema+          LABS:                        8.4    8.7   )-----------( 314      ( 01 Mar 2017 12:48 )             26.3     01 Mar 2017 12:48    129    |  91     |  12.0   ----------------------------<  102    5.0     |  30.0   |  0.53     Ca    8.4        01 Mar 2017 12:48    TPro  7.0    /  Alb  2.4    /  TBili  0.3    /  DBili  x      /  AST  13     /  ALT  9      /  AlkPhos  65     01 Mar 2017 12:48    MAYNOR CHANEY  CARDIAC MARKERS ( 02 Mar 2017 03:30 )  x     / <0.01 ng/mL / 11 U/L / x     / x      CARDIAC MARKERS ( 01 Mar 2017 12:48 )  x     / <0.01 ng/mL / 15 U/L / x     / x          PT/INR - ( 01 Mar 2017 12:48 )   PT: 15.5 sec;   INR: 1.40 ratio         PTT - ( 01 Mar 2017 12:48 )  PTT:32.7 sec      Assessment and Plan:  This patient is a 76 year old woman with a past medical history of A fib s/p TAVR, AAA repair, CAD s/p CABGx 1, ICMP/CHF (EF 40-45%)  recently discharged from [facility] 9 days ago where aortic dissection was discovered as inpatient who presents to the ED from NH for chest pain and sob. CP recurrent pressure like sensation.  Chest pain: trop negative, will add imdur, will check nuclear stress tomorrow (lexiscan), will avoid cath if possible because of recent aortic findings on CTA, ECG reordered (not traceable)  Aortic dissection with leak: stable, was recenly assesses by CTS, no intervention recommended  CHF: will switch to lasix 40 mg/day  HTN

## 2017-03-02 NOTE — DISCHARGE NOTE ADULT - MEDICATION SUMMARY - MEDICATIONS TO TAKE
I will START or STAY ON the medications listed below when I get home from the hospital:    acetaminophen 325 mg oral capsule  -- 2 cap(s) by mouth every 6 hours, As Needed  -- Indication: For Pain    traMADol 50 mg oral tablet  --  by mouth   -- Indication: For Pain    Milk of Magnesia  --  by mouth , As Needed  -- Indication: For GERD (gastroesophageal reflux disease)    Nitrostat 0.4 mg sublingual tablet  -- 1 tab(s) under tongue every 5 minutes, As Needed  -- Indication: For Angina     digoxin 125 mcg (0.125 mg) oral tablet  -- 1 tab(s) by mouth once a day  -- Indication: For Afib    Neurontin 100 mg oral capsule  -- 1 tab(s) by mouth 2 times a day  -- Indication: For Pain    Lipitor 10 mg oral tablet  -- 1 tab(s) by mouth once a day  -- Indication: For Hyperlipidemia    Ambien 5 mg oral tablet  -- 1 tab(s) by mouth once a day (at bedtime), As Needed  -- Indication: For insomnia    Coreg 6.25 mg oral tablet  -- 1 tab(s) by mouth 2 times a day  -- Indication: For  CAD    Advair Diskus 250 mcg-50 mcg inhalation powder  -- 1 puff(s) inhaled 2 times a day  -- Indication: For COPD (chronic obstructive pulmonary disease)    amLODIPine 10 mg oral tablet  -- 1 tab(s) by mouth once a day  -- Indication: For Htn    furosemide 20 mg oral tablet  -- 1 tab(s) by mouth once a day  -- Indication: For Pleural effusion    docusate sodium 100 mg oral capsule  -- 1 cap(s) by mouth 2 times a day  -- Indication: For COnstipation    potassium chloride 10 mEq oral capsule, extended release  -- 1 tab(s) by mouth once a day with lasix   -- Indication: For with lasix     Levaquin 500 mg oral tablet  -- 1 tab(s) by mouth every 24 hours for 3 more days  -- Indication: For Pneumonia due to infectious organism, unspecified laterality, unspecified part of lung    Synthroid 112 mcg (0.112 mg) oral tablet  -- 1 tab(s) by mouth once a day  -- Indication: For Hypothyroid I will START or STAY ON the medications listed below when I get home from the hospital:    acetaminophen 325 mg oral capsule  -- 2 cap(s) by mouth every 6 hours, As Needed  -- Indication: For Pain    traMADol 50 mg oral tablet  --  by mouth   -- Indication: For Pain    Milk of Magnesia  --  by mouth , As Needed  -- Indication: For GERD (gastroesophageal reflux disease)    Nitrostat 0.4 mg sublingual tablet  -- 1 tab(s) under tongue every 5 minutes, As Needed  -- Indication: For Angina     digoxin 125 mcg (0.125 mg) oral tablet  -- 1 tab(s) by mouth once a day  -- Indication: For Afib    Neurontin 100 mg oral capsule  -- 1 tab(s) by mouth 2 times a day  -- Indication: For Pain    Lipitor 10 mg oral tablet  -- 1 tab(s) by mouth once a day  -- Indication: For Hyperlipidemia    Ambien 5 mg oral tablet  -- 1 tab(s) by mouth once a day (at bedtime), As Needed  -- Indication: For insomnia    carvedilol 3.125 mg oral tablet  -- 1 tab(s) by mouth every 12 hours  -- Indication: For CAD    Advair Diskus 250 mcg-50 mcg inhalation powder  -- 1 puff(s) inhaled 2 times a day  -- Indication: For COPD (chronic obstructive pulmonary disease)    amLODIPine 10 mg oral tablet  -- 1 tab(s) by mouth once a day  -- Indication: For Htn    docusate sodium 100 mg oral capsule  -- 1 cap(s) by mouth 2 times a day  -- Indication: For COnstipation    Levaquin 500 mg oral tablet  -- 1 tab(s) by mouth every 24 hours for 3 more days  -- Indication: For Pneumonia due to infectious organism, unspecified laterality, unspecified part of lung    Synthroid 112 mcg (0.112 mg) oral tablet  -- 1 tab(s) by mouth once a day  -- Indication: For Hypothyroid I will START or STAY ON the medications listed below when I get home from the hospital:    acetaminophen 325 mg oral capsule  -- 2 cap(s) by mouth every 6 hours, As Needed  -- Indication: For Pain    traMADol 50 mg oral tablet  --  by mouth   -- Indication: For Pain    Milk of Magnesia  --  by mouth , As Needed  -- Indication: For GERD (gastroesophageal reflux disease)    Nitrostat 0.4 mg sublingual tablet  -- 1 tab(s) under tongue every 5 minutes, As Needed  -- Indication: For Angina     digoxin 125 mcg (0.125 mg) oral tablet  -- 1 tab(s) by mouth once a day  -- Indication: For Afib    Neurontin 100 mg oral capsule  -- 1 tab(s) by mouth 2 times a day  -- Indication: For Pain    Lipitor 10 mg oral tablet  -- 1 tab(s) by mouth once a day  -- Indication: For Hyperlipidemia    Ambien 5 mg oral tablet  -- 1 tab(s) by mouth once a day (at bedtime), As Needed  -- Indication: For insomnia    carvedilol 3.125 mg oral tablet  -- 1 tab(s) by mouth every 12 hours  -- Indication: For CAD    Advair Diskus 250 mcg-50 mcg inhalation powder  -- 1 puff(s) inhaled 2 times a day  -- Indication: For COPD (chronic obstructive pulmonary disease)    amLODIPine 10 mg oral tablet  -- 1 tab(s) by mouth once a day  -- Indication: For Htn    docusate sodium 100 mg oral capsule  -- 1 cap(s) by mouth 2 times a day  -- Indication: For COnstipation    Synthroid 112 mcg (0.112 mg) oral tablet  -- 1 tab(s) by mouth once a day  -- Indication: For Hypothyroid

## 2017-03-02 NOTE — DISCHARGE NOTE ADULT - PATIENT PORTAL LINK FT
“You can access the [facility] Patient Portal, offered by [facility], by registering with the following website: [email or web address]”

## 2017-03-03 LAB
ALBUMIN SERPL ELPH-MCNC: 2.7 G/DL — LOW (ref 3.3–5.2)
ALP SERPL-CCNC: 82 U/L — SIGNIFICANT CHANGE UP (ref 40–120)
ALT FLD-CCNC: 9 U/L — SIGNIFICANT CHANGE UP
ANION GAP SERPL CALC-SCNC: 11 MMOL/L — SIGNIFICANT CHANGE UP (ref 5–17)
ANISOCYTOSIS BLD QL: SLIGHT — SIGNIFICANT CHANGE UP
AST SERPL-CCNC: 10 U/L — SIGNIFICANT CHANGE UP
BASOPHILS NFR BLD AUTO: 1 % — SIGNIFICANT CHANGE UP (ref 0–2)
BILIRUB SERPL-MCNC: 0.4 MG/DL — SIGNIFICANT CHANGE UP (ref 0.4–2)
BUN SERPL-MCNC: 13 MG/DL — SIGNIFICANT CHANGE UP (ref 8–20)
CALCIUM SERPL-MCNC: 8.6 MG/DL — SIGNIFICANT CHANGE UP (ref 8.6–10.2)
CHLORIDE SERPL-SCNC: 91 MMOL/L — LOW (ref 98–107)
CO2 SERPL-SCNC: 29 MMOL/L — SIGNIFICANT CHANGE UP (ref 22–29)
CREAT SERPL-MCNC: 0.71 MG/DL — SIGNIFICANT CHANGE UP (ref 0.5–1.3)
GLUCOSE SERPL-MCNC: 99 MG/DL — SIGNIFICANT CHANGE UP (ref 70–115)
HCT VFR BLD CALC: 30.4 % — LOW (ref 37–47)
HGB BLD-MCNC: 9.8 G/DL — LOW (ref 12–16)
HYPOCHROMIA BLD QL: SLIGHT — SIGNIFICANT CHANGE UP
LYMPHOCYTES # BLD AUTO: 3 % — LOW (ref 20–55)
MACROCYTES BLD QL: SLIGHT — SIGNIFICANT CHANGE UP
MCHC RBC-ENTMCNC: 28.6 PG — SIGNIFICANT CHANGE UP (ref 27–31)
MCHC RBC-ENTMCNC: 32.2 G/DL — SIGNIFICANT CHANGE UP (ref 32–36)
MCV RBC AUTO: 88.6 FL — SIGNIFICANT CHANGE UP (ref 81–99)
MICROCYTES BLD QL: SLIGHT — SIGNIFICANT CHANGE UP
MONOCYTES NFR BLD AUTO: 5 % — SIGNIFICANT CHANGE UP (ref 3–10)
NEUTROPHILS NFR BLD AUTO: 90 % — HIGH (ref 37–73)
OVALOCYTES BLD QL SMEAR: SLIGHT — SIGNIFICANT CHANGE UP
PLAT MORPH BLD: NORMAL — SIGNIFICANT CHANGE UP
PLATELET # BLD AUTO: 316 K/UL — SIGNIFICANT CHANGE UP (ref 150–400)
POIKILOCYTOSIS BLD QL AUTO: SLIGHT — SIGNIFICANT CHANGE UP
POTASSIUM SERPL-MCNC: 4.3 MMOL/L — SIGNIFICANT CHANGE UP (ref 3.5–5.3)
POTASSIUM SERPL-SCNC: 4.3 MMOL/L — SIGNIFICANT CHANGE UP (ref 3.5–5.3)
PROT SERPL-MCNC: 7.9 G/DL — SIGNIFICANT CHANGE UP (ref 6.6–8.7)
RBC # BLD: 3.43 M/UL — LOW (ref 4.4–5.2)
RBC # FLD: 16.2 % — HIGH (ref 11–15.6)
RBC BLD AUTO: ABNORMAL
SODIUM SERPL-SCNC: 131 MMOL/L — LOW (ref 135–145)
VARIANT LYMPHS # BLD: 1 % — SIGNIFICANT CHANGE UP (ref 0–6)
WBC # BLD: 9 K/UL — SIGNIFICANT CHANGE UP (ref 4.8–10.8)
WBC # FLD AUTO: 9 K/UL — SIGNIFICANT CHANGE UP (ref 4.8–10.8)

## 2017-03-03 PROCEDURE — 99233 SBSQ HOSP IP/OBS HIGH 50: CPT

## 2017-03-03 RX ADMIN — Medication 650 MILLIGRAM(S): at 22:18

## 2017-03-03 RX ADMIN — Medication 81 MILLIGRAM(S): at 11:54

## 2017-03-03 RX ADMIN — Medication 100 MILLIGRAM(S): at 11:54

## 2017-03-03 RX ADMIN — MEROPENEM 200 MILLIGRAM(S): 1 INJECTION INTRAVENOUS at 22:13

## 2017-03-03 RX ADMIN — Medication 10 MILLIEQUIVALENT(S): at 11:54

## 2017-03-03 RX ADMIN — Medication 1 TABLET(S): at 11:54

## 2017-03-03 RX ADMIN — Medication 0.12 MILLIGRAM(S): at 05:50

## 2017-03-03 RX ADMIN — Medication 40 MILLIGRAM(S): at 05:50

## 2017-03-03 RX ADMIN — Medication 112 MICROGRAM(S): at 05:50

## 2017-03-03 RX ADMIN — ATORVASTATIN CALCIUM 10 MILLIGRAM(S): 80 TABLET, FILM COATED ORAL at 22:13

## 2017-03-03 RX ADMIN — CARVEDILOL PHOSPHATE 6.25 MILLIGRAM(S): 80 CAPSULE, EXTENDED RELEASE ORAL at 05:50

## 2017-03-03 RX ADMIN — Medication 650 MILLIGRAM(S): at 21:39

## 2017-03-03 RX ADMIN — BUDESONIDE AND FORMOTEROL FUMARATE DIHYDRATE 1 PUFF(S): 160; 4.5 AEROSOL RESPIRATORY (INHALATION) at 20:48

## 2017-03-03 RX ADMIN — ISOSORBIDE MONONITRATE 30 MILLIGRAM(S): 60 TABLET, EXTENDED RELEASE ORAL at 11:54

## 2017-03-03 RX ADMIN — CARVEDILOL PHOSPHATE 6.25 MILLIGRAM(S): 80 CAPSULE, EXTENDED RELEASE ORAL at 17:56

## 2017-03-03 RX ADMIN — AMLODIPINE BESYLATE 5 MILLIGRAM(S): 2.5 TABLET ORAL at 05:50

## 2017-03-03 RX ADMIN — GABAPENTIN 100 MILLIGRAM(S): 400 CAPSULE ORAL at 17:56

## 2017-03-03 RX ADMIN — MEROPENEM 200 MILLIGRAM(S): 1 INJECTION INTRAVENOUS at 14:01

## 2017-03-03 RX ADMIN — MEROPENEM 200 MILLIGRAM(S): 1 INJECTION INTRAVENOUS at 05:50

## 2017-03-03 RX ADMIN — GABAPENTIN 100 MILLIGRAM(S): 400 CAPSULE ORAL at 05:50

## 2017-03-04 DIAGNOSIS — J44.9 CHRONIC OBSTRUCTIVE PULMONARY DISEASE, UNSPECIFIED: ICD-10-CM

## 2017-03-04 LAB
ALBUMIN SERPL ELPH-MCNC: 2.2 G/DL — LOW (ref 3.3–5.2)
ALP SERPL-CCNC: 69 U/L — SIGNIFICANT CHANGE UP (ref 40–120)
ALT FLD-CCNC: 7 U/L — SIGNIFICANT CHANGE UP
ANION GAP SERPL CALC-SCNC: 9 MMOL/L — SIGNIFICANT CHANGE UP (ref 5–17)
AST SERPL-CCNC: 9 U/L — SIGNIFICANT CHANGE UP
BASOPHILS # BLD AUTO: 0 K/UL — SIGNIFICANT CHANGE UP (ref 0–0.2)
BASOPHILS NFR BLD AUTO: 0.5 % — SIGNIFICANT CHANGE UP (ref 0–2)
BILIRUB SERPL-MCNC: 0.3 MG/DL — LOW (ref 0.4–2)
BUN SERPL-MCNC: 14 MG/DL — SIGNIFICANT CHANGE UP (ref 8–20)
CALCIUM SERPL-MCNC: 8.3 MG/DL — LOW (ref 8.6–10.2)
CHLORIDE SERPL-SCNC: 93 MMOL/L — LOW (ref 98–107)
CO2 SERPL-SCNC: 29 MMOL/L — SIGNIFICANT CHANGE UP (ref 22–29)
CREAT SERPL-MCNC: 0.6 MG/DL — SIGNIFICANT CHANGE UP (ref 0.5–1.3)
EOSINOPHIL # BLD AUTO: 0.1 K/UL — SIGNIFICANT CHANGE UP (ref 0–0.5)
EOSINOPHIL NFR BLD AUTO: 1.2 % — SIGNIFICANT CHANGE UP (ref 0–6)
GLUCOSE SERPL-MCNC: 99 MG/DL — SIGNIFICANT CHANGE UP (ref 70–115)
HCT VFR BLD CALC: 28.1 % — LOW (ref 37–47)
HGB BLD-MCNC: 8.9 G/DL — LOW (ref 12–16)
LYMPHOCYTES # BLD AUTO: 0.8 K/UL — LOW (ref 1–4.8)
LYMPHOCYTES # BLD AUTO: 14.1 % — LOW (ref 20–55)
MCHC RBC-ENTMCNC: 28 PG — SIGNIFICANT CHANGE UP (ref 27–31)
MCHC RBC-ENTMCNC: 31.7 G/DL — LOW (ref 32–36)
MCV RBC AUTO: 88.4 FL — SIGNIFICANT CHANGE UP (ref 81–99)
MONOCYTES # BLD AUTO: 0.8 K/UL — SIGNIFICANT CHANGE UP (ref 0–0.8)
MONOCYTES NFR BLD AUTO: 13.8 % — HIGH (ref 3–10)
NEUTROPHILS # BLD AUTO: 4.1 K/UL — SIGNIFICANT CHANGE UP (ref 1.8–8)
NEUTROPHILS NFR BLD AUTO: 69.9 % — SIGNIFICANT CHANGE UP (ref 37–73)
PLATELET # BLD AUTO: 301 K/UL — SIGNIFICANT CHANGE UP (ref 150–400)
POTASSIUM SERPL-MCNC: 4.2 MMOL/L — SIGNIFICANT CHANGE UP (ref 3.5–5.3)
POTASSIUM SERPL-SCNC: 4.2 MMOL/L — SIGNIFICANT CHANGE UP (ref 3.5–5.3)
PROT SERPL-MCNC: 7.1 G/DL — SIGNIFICANT CHANGE UP (ref 6.6–8.7)
RBC # BLD: 3.18 M/UL — LOW (ref 4.4–5.2)
RBC # FLD: 16 % — HIGH (ref 11–15.6)
SODIUM SERPL-SCNC: 131 MMOL/L — LOW (ref 135–145)
WBC # BLD: 5.9 K/UL — SIGNIFICANT CHANGE UP (ref 4.8–10.8)
WBC # FLD AUTO: 5.9 K/UL — SIGNIFICANT CHANGE UP (ref 4.8–10.8)

## 2017-03-04 PROCEDURE — 99233 SBSQ HOSP IP/OBS HIGH 50: CPT

## 2017-03-04 RX ADMIN — TRAMADOL HYDROCHLORIDE 50 MILLIGRAM(S): 50 TABLET ORAL at 06:00

## 2017-03-04 RX ADMIN — ISOSORBIDE MONONITRATE 30 MILLIGRAM(S): 60 TABLET, EXTENDED RELEASE ORAL at 12:17

## 2017-03-04 RX ADMIN — CARVEDILOL PHOSPHATE 6.25 MILLIGRAM(S): 80 CAPSULE, EXTENDED RELEASE ORAL at 05:18

## 2017-03-04 RX ADMIN — Medication 100 MILLIGRAM(S): at 12:16

## 2017-03-04 RX ADMIN — MEROPENEM 200 MILLIGRAM(S): 1 INJECTION INTRAVENOUS at 05:17

## 2017-03-04 RX ADMIN — MEROPENEM 200 MILLIGRAM(S): 1 INJECTION INTRAVENOUS at 22:00

## 2017-03-04 RX ADMIN — Medication 10 MILLIEQUIVALENT(S): at 12:17

## 2017-03-04 RX ADMIN — Medication 0.12 MILLIGRAM(S): at 05:18

## 2017-03-04 RX ADMIN — TRAMADOL HYDROCHLORIDE 50 MILLIGRAM(S): 50 TABLET ORAL at 05:16

## 2017-03-04 RX ADMIN — GABAPENTIN 100 MILLIGRAM(S): 400 CAPSULE ORAL at 17:37

## 2017-03-04 RX ADMIN — GABAPENTIN 100 MILLIGRAM(S): 400 CAPSULE ORAL at 05:18

## 2017-03-04 RX ADMIN — AMLODIPINE BESYLATE 5 MILLIGRAM(S): 2.5 TABLET ORAL at 05:18

## 2017-03-04 RX ADMIN — Medication 112 MICROGRAM(S): at 05:15

## 2017-03-04 RX ADMIN — ATORVASTATIN CALCIUM 10 MILLIGRAM(S): 80 TABLET, FILM COATED ORAL at 22:00

## 2017-03-04 RX ADMIN — Medication 1 TABLET(S): at 12:16

## 2017-03-04 RX ADMIN — BUDESONIDE AND FORMOTEROL FUMARATE DIHYDRATE 1 PUFF(S): 160; 4.5 AEROSOL RESPIRATORY (INHALATION) at 20:41

## 2017-03-04 RX ADMIN — CARVEDILOL PHOSPHATE 6.25 MILLIGRAM(S): 80 CAPSULE, EXTENDED RELEASE ORAL at 17:37

## 2017-03-04 RX ADMIN — Medication 81 MILLIGRAM(S): at 12:17

## 2017-03-04 RX ADMIN — BUDESONIDE AND FORMOTEROL FUMARATE DIHYDRATE 1 PUFF(S): 160; 4.5 AEROSOL RESPIRATORY (INHALATION) at 08:53

## 2017-03-04 RX ADMIN — Medication 40 MILLIGRAM(S): at 05:16

## 2017-03-04 RX ADMIN — MEROPENEM 200 MILLIGRAM(S): 1 INJECTION INTRAVENOUS at 13:44

## 2017-03-05 DIAGNOSIS — I71.4 ABDOMINAL AORTIC ANEURYSM, WITHOUT RUPTURE: ICD-10-CM

## 2017-03-05 DIAGNOSIS — M06.9 RHEUMATOID ARTHRITIS, UNSPECIFIED: ICD-10-CM

## 2017-03-05 LAB
ANION GAP SERPL CALC-SCNC: 21 MMOL/L — HIGH (ref 5–17)
BUN SERPL-MCNC: 12 MG/DL — SIGNIFICANT CHANGE UP (ref 8–20)
CALCIUM SERPL-MCNC: 8.7 MG/DL — SIGNIFICANT CHANGE UP (ref 8.6–10.2)
CHLORIDE SERPL-SCNC: 89 MMOL/L — LOW (ref 98–107)
CO2 SERPL-SCNC: 17 MMOL/L — LOW (ref 22–29)
CREAT SERPL-MCNC: 0.59 MG/DL — SIGNIFICANT CHANGE UP (ref 0.5–1.3)
GLUCOSE SERPL-MCNC: 114 MG/DL — SIGNIFICANT CHANGE UP (ref 70–115)
HCT VFR BLD CALC: 30.5 % — LOW (ref 37–47)
HGB BLD-MCNC: 10.1 G/DL — LOW (ref 12–16)
MCHC RBC-ENTMCNC: 28.7 PG — SIGNIFICANT CHANGE UP (ref 27–31)
MCHC RBC-ENTMCNC: 33.1 G/DL — SIGNIFICANT CHANGE UP (ref 32–36)
MCV RBC AUTO: 86.6 FL — SIGNIFICANT CHANGE UP (ref 81–99)
PLATELET # BLD AUTO: 219 K/UL — SIGNIFICANT CHANGE UP (ref 150–400)
POTASSIUM SERPL-MCNC: 4.7 MMOL/L — SIGNIFICANT CHANGE UP (ref 3.5–5.3)
POTASSIUM SERPL-SCNC: 4.7 MMOL/L — SIGNIFICANT CHANGE UP (ref 3.5–5.3)
PROCALCITONIN SERPL-MCNC: <0.23 NG/ML — SIGNIFICANT CHANGE UP (ref 0–0.5)
RBC # BLD: 3.52 M/UL — LOW (ref 4.4–5.2)
RBC # FLD: 15.7 % — HIGH (ref 11–15.6)
SODIUM SERPL-SCNC: 127 MMOL/L — LOW (ref 135–145)
WBC # BLD: 6.9 K/UL — SIGNIFICANT CHANGE UP (ref 4.8–10.8)
WBC # FLD AUTO: 6.9 K/UL — SIGNIFICANT CHANGE UP (ref 4.8–10.8)

## 2017-03-05 PROCEDURE — 99232 SBSQ HOSP IP/OBS MODERATE 35: CPT

## 2017-03-05 PROCEDURE — 99233 SBSQ HOSP IP/OBS HIGH 50: CPT

## 2017-03-05 RX ORDER — FUROSEMIDE 40 MG
1 TABLET ORAL
Qty: 0 | Refills: 0 | COMMUNITY

## 2017-03-05 RX ORDER — ASPIRIN/CALCIUM CARB/MAGNESIUM 324 MG
1 TABLET ORAL
Qty: 0 | Refills: 0 | COMMUNITY

## 2017-03-05 RX ORDER — FUROSEMIDE 40 MG
1 TABLET ORAL
Qty: 30 | Refills: 0 | OUTPATIENT
Start: 2017-03-05 | End: 2017-04-04

## 2017-03-05 RX ADMIN — ISOSORBIDE MONONITRATE 30 MILLIGRAM(S): 60 TABLET, EXTENDED RELEASE ORAL at 11:21

## 2017-03-05 RX ADMIN — Medication 1 TABLET(S): at 11:22

## 2017-03-05 RX ADMIN — AMLODIPINE BESYLATE 5 MILLIGRAM(S): 2.5 TABLET ORAL at 05:50

## 2017-03-05 RX ADMIN — Medication 100 MILLIGRAM(S): at 11:21

## 2017-03-05 RX ADMIN — GABAPENTIN 100 MILLIGRAM(S): 400 CAPSULE ORAL at 05:50

## 2017-03-05 RX ADMIN — ATORVASTATIN CALCIUM 10 MILLIGRAM(S): 80 TABLET, FILM COATED ORAL at 21:30

## 2017-03-05 RX ADMIN — Medication 10 MILLIEQUIVALENT(S): at 11:22

## 2017-03-05 RX ADMIN — MEROPENEM 200 MILLIGRAM(S): 1 INJECTION INTRAVENOUS at 05:50

## 2017-03-05 RX ADMIN — Medication 112 MICROGRAM(S): at 05:50

## 2017-03-05 RX ADMIN — Medication 81 MILLIGRAM(S): at 11:22

## 2017-03-05 RX ADMIN — Medication 650 MILLIGRAM(S): at 17:48

## 2017-03-05 RX ADMIN — BUDESONIDE AND FORMOTEROL FUMARATE DIHYDRATE 1 PUFF(S): 160; 4.5 AEROSOL RESPIRATORY (INHALATION) at 20:42

## 2017-03-05 RX ADMIN — CARVEDILOL PHOSPHATE 6.25 MILLIGRAM(S): 80 CAPSULE, EXTENDED RELEASE ORAL at 05:50

## 2017-03-05 RX ADMIN — MEROPENEM 200 MILLIGRAM(S): 1 INJECTION INTRAVENOUS at 16:21

## 2017-03-05 RX ADMIN — MEROPENEM 200 MILLIGRAM(S): 1 INJECTION INTRAVENOUS at 21:30

## 2017-03-05 RX ADMIN — CARVEDILOL PHOSPHATE 6.25 MILLIGRAM(S): 80 CAPSULE, EXTENDED RELEASE ORAL at 17:28

## 2017-03-05 RX ADMIN — Medication 40 MILLIGRAM(S): at 05:50

## 2017-03-05 RX ADMIN — Medication 0.12 MILLIGRAM(S): at 05:50

## 2017-03-05 RX ADMIN — Medication 650 MILLIGRAM(S): at 17:28

## 2017-03-05 RX ADMIN — GABAPENTIN 100 MILLIGRAM(S): 400 CAPSULE ORAL at 17:28

## 2017-03-05 RX ADMIN — BUDESONIDE AND FORMOTEROL FUMARATE DIHYDRATE 1 PUFF(S): 160; 4.5 AEROSOL RESPIRATORY (INHALATION) at 09:45

## 2017-03-06 PROCEDURE — 99233 SBSQ HOSP IP/OBS HIGH 50: CPT

## 2017-03-06 RX ORDER — SODIUM CHLORIDE 9 MG/ML
500 INJECTION INTRAMUSCULAR; INTRAVENOUS; SUBCUTANEOUS ONCE
Qty: 0 | Refills: 0 | Status: COMPLETED | OUTPATIENT
Start: 2017-03-06 | End: 2017-03-06

## 2017-03-06 RX ADMIN — Medication 100 MILLIGRAM(S): at 12:57

## 2017-03-06 RX ADMIN — Medication 650 MILLIGRAM(S): at 12:10

## 2017-03-06 RX ADMIN — MEROPENEM 200 MILLIGRAM(S): 1 INJECTION INTRAVENOUS at 06:25

## 2017-03-06 RX ADMIN — GABAPENTIN 100 MILLIGRAM(S): 400 CAPSULE ORAL at 17:25

## 2017-03-06 RX ADMIN — CARVEDILOL PHOSPHATE 6.25 MILLIGRAM(S): 80 CAPSULE, EXTENDED RELEASE ORAL at 06:25

## 2017-03-06 RX ADMIN — BUDESONIDE AND FORMOTEROL FUMARATE DIHYDRATE 1 PUFF(S): 160; 4.5 AEROSOL RESPIRATORY (INHALATION) at 08:46

## 2017-03-06 RX ADMIN — Medication 40 MILLIGRAM(S): at 06:25

## 2017-03-06 RX ADMIN — Medication 650 MILLIGRAM(S): at 22:30

## 2017-03-06 RX ADMIN — SODIUM CHLORIDE 2000 MILLILITER(S): 9 INJECTION INTRAMUSCULAR; INTRAVENOUS; SUBCUTANEOUS at 18:25

## 2017-03-06 RX ADMIN — Medication 112 MICROGRAM(S): at 06:25

## 2017-03-06 RX ADMIN — AMLODIPINE BESYLATE 5 MILLIGRAM(S): 2.5 TABLET ORAL at 06:25

## 2017-03-06 RX ADMIN — Medication 650 MILLIGRAM(S): at 10:53

## 2017-03-06 RX ADMIN — BUDESONIDE AND FORMOTEROL FUMARATE DIHYDRATE 1 PUFF(S): 160; 4.5 AEROSOL RESPIRATORY (INHALATION) at 21:14

## 2017-03-06 RX ADMIN — Medication 0.12 MILLIGRAM(S): at 06:25

## 2017-03-06 RX ADMIN — Medication 650 MILLIGRAM(S): at 21:47

## 2017-03-06 RX ADMIN — ATORVASTATIN CALCIUM 10 MILLIGRAM(S): 80 TABLET, FILM COATED ORAL at 21:46

## 2017-03-06 RX ADMIN — GABAPENTIN 100 MILLIGRAM(S): 400 CAPSULE ORAL at 06:25

## 2017-03-06 RX ADMIN — Medication 81 MILLIGRAM(S): at 12:57

## 2017-03-06 RX ADMIN — Medication 10 MILLIEQUIVALENT(S): at 12:57

## 2017-03-06 RX ADMIN — Medication 1 TABLET(S): at 12:57

## 2017-03-06 NOTE — PROGRESS NOTE ADULT - ASSESSMENT
75 YO FEMALE HX OF AORTIC DISSECTION   IN [facility] STAY AT [place] AND  RETURNED WITH INCREASED SOB  CT SCAN WITH POSSIBLE    UPPER  LOBE PNEUMONIA   PT WITH REPORTED PCN ALLERGY WITH RASH  ON [place] WITH CLINICAL IMPROVEMENT  IF CONTINUES TO IMPROVE OK FOR D/C BACK TO [facility]  CAN CHANGE TO LEVAQUIN TO COMPLETE  7 DAYS  WILL FOLLOW UP AS NEEDED PLEASE CALL IF QUESTIONS
76 year old woman with a past medical history of A fib s/p TAVR, AAA repair, CAD s/p CABGx 1, ICMP/CHF (EF 40-45%)  recently discharged from [facility] 9 days ago where aortic dissection was discovered as inpatient who presents to the ED from NH for chest pain and sob. CP recurrent pressure like sensation.    1. SPECT without ischemia  2. Pt reports no chest pain at this time  3. Continue medical therapy  4. BP controlled  5. Otherwise stable from cardiac standpoint for d/c back to NH
for d/c to [facility]   on po levaquin

## 2017-03-06 NOTE — PHYSICAL THERAPY INITIAL EVALUATION ADULT - CRITERIA FOR SKILLED THERAPEUTIC INTERVENTIONS
predicted duration of therapy intervention/impairments found/anticipated discharge recommendation/therapy frequency/functional limitations in following categories/rehab potential

## 2017-03-06 NOTE — PHYSICAL THERAPY INITIAL EVALUATION ADULT - ACTIVE RANGE OF MOTION EXAMINATION, REHAB EVAL
bilateral  lower extremity Active ROM was WFL (within functional limits)/deficits as listed below/b/l shoulder flexion to 120deg/bilateral upper extremity Active ROM was WFL (within functional limits)

## 2017-03-06 NOTE — PHYSICAL THERAPY INITIAL EVALUATION ADULT - ADDITIONAL COMMENTS
[place]  [place], 7-8 steps to enter and about 5 steps to main level. Presently, the pt is from subacute rehab at [facility].

## 2017-03-07 DIAGNOSIS — K59.00 CONSTIPATION, UNSPECIFIED: ICD-10-CM

## 2017-03-07 DIAGNOSIS — R52 PAIN, UNSPECIFIED: ICD-10-CM

## 2017-03-07 DIAGNOSIS — I95.1 ORTHOSTATIC HYPOTENSION: ICD-10-CM

## 2017-03-07 PROCEDURE — 99223 1ST HOSP IP/OBS HIGH 75: CPT

## 2017-03-07 PROCEDURE — 99233 SBSQ HOSP IP/OBS HIGH 50: CPT

## 2017-03-07 PROCEDURE — 99497 ADVNCD CARE PLAN 30 MIN: CPT | Mod: 25

## 2017-03-07 RX ORDER — FUROSEMIDE 40 MG
1 TABLET ORAL
Qty: 0 | Refills: 0 | COMMUNITY

## 2017-03-07 RX ORDER — ONDANSETRON 8 MG/1
4 TABLET, FILM COATED ORAL ONCE
Qty: 0 | Refills: 0 | Status: COMPLETED | OUTPATIENT
Start: 2017-03-07 | End: 2017-03-07

## 2017-03-07 RX ORDER — SODIUM CHLORIDE 9 MG/ML
500 INJECTION INTRAMUSCULAR; INTRAVENOUS; SUBCUTANEOUS ONCE
Qty: 0 | Refills: 0 | Status: COMPLETED | OUTPATIENT
Start: 2017-03-07 | End: 2017-03-07

## 2017-03-07 RX ADMIN — Medication 1 TABLET(S): at 11:01

## 2017-03-07 RX ADMIN — GABAPENTIN 100 MILLIGRAM(S): 400 CAPSULE ORAL at 17:41

## 2017-03-07 RX ADMIN — TRAMADOL HYDROCHLORIDE 50 MILLIGRAM(S): 50 TABLET ORAL at 10:59

## 2017-03-07 RX ADMIN — Medication 650 MILLIGRAM(S): at 18:52

## 2017-03-07 RX ADMIN — Medication 650 MILLIGRAM(S): at 05:02

## 2017-03-07 RX ADMIN — Medication 650 MILLIGRAM(S): at 17:41

## 2017-03-07 RX ADMIN — TRAMADOL HYDROCHLORIDE 50 MILLIGRAM(S): 50 TABLET ORAL at 01:45

## 2017-03-07 RX ADMIN — ZOLPIDEM TARTRATE 5 MILLIGRAM(S): 10 TABLET ORAL at 21:17

## 2017-03-07 RX ADMIN — SODIUM CHLORIDE 2000 MILLILITER(S): 9 INJECTION INTRAMUSCULAR; INTRAVENOUS; SUBCUTANEOUS at 09:30

## 2017-03-07 RX ADMIN — TRAMADOL HYDROCHLORIDE 50 MILLIGRAM(S): 50 TABLET ORAL at 11:45

## 2017-03-07 RX ADMIN — Medication 112 MICROGRAM(S): at 05:01

## 2017-03-07 RX ADMIN — ONDANSETRON 4 MILLIGRAM(S): 8 TABLET, FILM COATED ORAL at 14:20

## 2017-03-07 RX ADMIN — ATORVASTATIN CALCIUM 10 MILLIGRAM(S): 80 TABLET, FILM COATED ORAL at 21:15

## 2017-03-07 RX ADMIN — BUDESONIDE AND FORMOTEROL FUMARATE DIHYDRATE 1 PUFF(S): 160; 4.5 AEROSOL RESPIRATORY (INHALATION) at 08:54

## 2017-03-07 RX ADMIN — TRAMADOL HYDROCHLORIDE 50 MILLIGRAM(S): 50 TABLET ORAL at 00:57

## 2017-03-07 RX ADMIN — BUDESONIDE AND FORMOTEROL FUMARATE DIHYDRATE 1 PUFF(S): 160; 4.5 AEROSOL RESPIRATORY (INHALATION) at 20:17

## 2017-03-07 RX ADMIN — Medication 0.12 MILLIGRAM(S): at 05:01

## 2017-03-07 RX ADMIN — Medication 650 MILLIGRAM(S): at 06:00

## 2017-03-07 RX ADMIN — GABAPENTIN 100 MILLIGRAM(S): 400 CAPSULE ORAL at 05:01

## 2017-03-07 RX ADMIN — Medication 100 MILLIGRAM(S): at 11:01

## 2017-03-07 RX ADMIN — AMLODIPINE BESYLATE 5 MILLIGRAM(S): 2.5 TABLET ORAL at 05:01

## 2017-03-07 RX ADMIN — Medication 81 MILLIGRAM(S): at 11:01

## 2017-03-07 NOTE — GOALS OF CARE CONVERSATION - PERSONAL ADVANCE DIRECTIVE - CONVERSATION DETAILS
Discussed with patient goals of care plan [facility] she is very concerned if she doesn't return soon she may lose her bed.  Discussed care related to pain and constipation suggested options for treatments  Discussed MOLST form and DNR DNI  explained options  Patient agreed to do MOLST form placed on chart

## 2017-03-07 NOTE — DIETITIAN INITIAL EVALUATION ADULT. - ETIOLOGY
Related to inadequate energy intake in setting of recent cardiac surgeries and multiple hospitalizations

## 2017-03-07 NOTE — CHART NOTE - NSCHARTNOTEFT_GEN_A_CORE
Upon Nutritional Assessment by the Registered Dietitian your patient was determined to meet criteria / has evidence of the following diagnosis/diagnoses:          [ ]  Mild Protein Calorie Malnutrition        [ ]  Moderate Protein Calorie Malnutrition        [x ] Severe Protein Calorie Malnutrition        [ ] Unspecified Protein Calorie Malnutrition        [ ] Underweight / BMI <19        [ ] Morbid Obesity / BMI > 40      Findings as based on:  •  Comprehensive nutrition assessment and consultation  •  Calorie counts (nutrient intake analysis)  •  Food acceptance and intake status from observations by staff  •  Follow up  •  Patient education  •  Intervention secondary to interdisciplinary rounds  •   concerns      Treatment:    The following diet has been recommended:    Please order swallow evaluation   Ensure Clear TID    PROVIDER Section:     By signing this assessment you are acknowledging and agree with the diagnosis/diagnoses assigned by the Registered Dietitian    Comments:

## 2017-03-07 NOTE — CONSULT NOTE ADULT - ATTENDING COMMENTS
COUNSELING:    Face to face meeting to discuss Advanced Care Planning - Time Spent __25____ Minutes.  See goals of care note.    More than 50% time spent in counseling and coordinating care. __60____ Minutes.     Thank you for the opportunity to assist with the care of this patient.   [facility] [phone].

## 2017-03-07 NOTE — CONSULT NOTE ADULT - SUBJECTIVE AND OBJECTIVE BOX
HPI:  75 y/o female with h/o Abdominal and thoracic aortic aneurysm repaire with leakage, was referred from [facility] because of left side chest pain. pain is sharp on and off for more than 2 weeks. no clear precipitating factors. pain is not related to breathing, eating, movements or effort. pain is not associated with nausea or diaphoresis. pain may last < 5 min. CTA chest and abdomen showed no significant change form prior study 2 weeks ago. chest CT shows right upper lobe pneumonia. . patient has occasional cough with whitish sputum. Hgb dropped 3 grams since 2 weeks ago. patient denies any blood in the stools or urine (01 Mar 2017 23:03)      PERTINENT PMH REVIEWED: Yes No    PAST MEDICAL & SURGICAL HISTORY:  DVT (deep venous thrombosis)  Rheumatoid arthritis  COPD (chronic obstructive pulmonary disease)  Hyperlipidemia  Hypertension  Spinal stenosis  Hearing loss  Urine retention  GERD (gastroesophageal reflux disease)  Orthostatic hypotension  Abdominal aortic aneurysm  Pleural effusion  Syncope and collapse  Hypothyroidism, unspecified type  Coronary artery disease involving native coronary artery of native heart without angina pectoris  CHF (congestive heart failure)  Hypotension  S/P AVR  S/P CABG x 1  S/P AAA repair  No significant past surgical history      SOCIAL HISTORY:  EtOH Yes   No                                    Drugs  Yes  No                                    smoker  nonsmoker                                    Admitted from: home  SNF _________ [place] ________Surrogate/HCP/Guardian: Phone#:    FAMILY HISTORY:  No pertinent family history in first degree relatives      Baseline ADLs (prior to admission):  Independent/ Dependent      Present Symptoms:     Dyspnea: 0 1 2 3   Nausea/Vomiting: Yes No  Anxiety:  Yes No  Depression: Yes No  Fatigue: Yes No  Loss of appetite: Yes No    Pain:             Character-            Duration-            Effect-            Factors-            Frequency-            Location-            Severity-    Review of Systems: Reviewed                     Negative:                     Positive:  Unable to obtain due to poor mentation   All others negative    MEDICATIONS  (STANDING):  aspirin enteric coated 81milliGRAM(s) Oral daily  digoxin     Tablet 0.125milliGRAM(s) Oral daily  gabapentin 100milliGRAM(s) Oral two times a day  atorvastatin 10milliGRAM(s) Oral at bedtime  buDESOnide 160 MICROgram(s)/formoterol 4.5 MICROgram(s) Inhaler 1Puff(s) Inhalation two times a day  docusate sodium 100milliGRAM(s) Oral daily  levothyroxine 112MICROGram(s) Oral daily  lactobacillus acidophilus 1Tablet(s) Oral daily  amLODIPine   Tablet 5milliGRAM(s) Oral daily  isosorbide   mononitrate ER Tablet (IMDUR) 30milliGRAM(s) Oral daily  levoFLOXacin  Tablet 750milliGRAM(s) Oral every 24 hours    MEDICATIONS  (PRN):  acetaminophen   Tablet. 650milliGRAM(s) Oral every 6 hours PRN Mild Pain (1 - 3)  traMADol 50milliGRAM(s) Oral two times a day PRN Moderate Pain (4 - 6)  magnesium hydroxide Suspension 30milliLiter(s) Oral daily PRN Constipation  nitroglycerin     SubLingual 0.3milliGRAM(s) SubLingual every 5 minutes PRN Chest Pain  zolpidem 5milliGRAM(s) Oral at bedtime PRN Insomnia      Allergies    adhesives (Unknown)  Codeine Phosphate (Other)  ferrous sulfate (Other)  penicillin (Other)    Intolerances        PHYSICAL EXAM:    Vital Signs Last 24 Hrs  T(C): 36.6, Max: 36.9 (03-06 @ 16:50)  T(F): 97.8, Max: 98.4 (03-06 @ 16:50)  HR: 79 (72 - 79)  BP: 132/64 (68/40 - 132/64)  BP(mean): --  RR: 18 (16 - 18)  SpO2: 98% (94% - 98%)    General: alert  oriented x ____ lethargic agitated                  cachexia  nonverbal  coma    Karnofsky:  %    HEENT: normal  dry mouth  ET tube/trach    Lungs: comfortable tachypnea/labored breathing  excessive secretions    CV: normal  tachycardia    GI: normal  distended  tender  no BS               PEG/NG/OG tube  constipation  last BM:     : normal  incontinent  oliguria/anuria  snow    MSK: normal  weakness  edema             ambulatory  bedbound/wheelchair bound    Skin: normal  pressure ulcers- Stage_____  no rash    LABS:                        10.1   6.9   )-----------( 219      ( 05 Mar 2017 09:57 )             30.5     05 Mar 2017 09:57    127    |  89     |  12.0   ----------------------------<  114    4.7     |  17.0   |  0.59     Ca    8.7        05 Mar 2017 09:57          I&O's Summary  I & Os for 24h ending 06 Mar 2017 07:00  =============================================  IN: 360 ml / OUT: 0 ml / NET: 360 ml    I & Os for current day (as of 07 Mar 2017 06:33)  =============================================  IN: 800 ml / OUT: 900 ml / NET: -100 ml      RADIOLOGY & ADDITIONAL STUDIES:    ADVANCE DIRECTIVES:   DNR YES NO  Completed on:                     MOLST  YES NO   Completed on:  Living Will  YES NO   Completed on:    COUNSELING:    Face to face meeting to discuss Advanced Care Planning - Time Spent ______ Minutes.  See goals of care note.    More than 50% time spent in counseling and coordinating care. ______ Minutes.     Thank you for the opportunity to assist with the care of this patient.   [facility] [phone]. HPI:  75 y/o female with h/o Abdominal and thoracic aortic aneurysm repaire with leakage, was referred from [facility] because of left side chest pain. pain is sharp on and off for more than 2 weeks. no clear precipitating factors. pain is not related to breathing, eating, movements or effort. pain is not associated with nausea or diaphoresis. pain may last < 5 min. CTA chest and abdomen showed no significant change form prior study 2 weeks ago. chest CT shows right upper lobe pneumonia. . patient has occasional cough with whitish sputum. Hgb dropped 3 grams since 2 weeks ago. patient denies any blood in the stools or urine (01 Mar 2017 23:03)      PERTINENT PMH REVIEWED: Yes    PAST MEDICAL & SURGICAL HISTORY:  DVT (deep venous thrombosis)  Rheumatoid arthritis  COPD (chronic obstructive pulmonary disease)  Hyperlipidemia  Hypertension  Spinal stenosis  Hearing loss  Urine retention  GERD (gastroesophageal reflux disease)  Orthostatic hypotension  Abdominal aortic aneurysm  Pleural effusion  Syncope and collapse  Hypothyroidism, unspecified type  Coronary artery disease involving native coronary artery of native heart without angina pectoris  CHF (congestive heart failure)  Hypotension  S/P AVR  S/P CABG x 1  S/P AAA repair  No significant past surgical history      SOCIAL HISTORY:  EtOH Yes   No                                    Drugs  Yes  No                                    smoker  nonsmoker                                    Admitted from: [facility]    FAMILY HISTORY:  No pertinent family history in first degree relatives      Baseline ADLs (prior to admission):   Dependent      Present Symptoms:     Dyspnea: 0 1 2 3   Nausea/Vomiting: Yes No  Anxiety:  Yes No  Depression: Yes No  Fatigue: Yes No  Loss of appetite: Yes No    Pain: yes  decubitus ulcers             Character- stabbing             Duration-constant             Effect-            Factors- in bed  turn and position            Frequency-            Location- sacrum            Severity-5/10    Review of Systems: Reviewed                                          Positive:  pain constipation    All others negative    MEDICATIONS  (STANDING):  aspirin enteric coated 81milliGRAM(s) Oral daily  digoxin     Tablet 0.125milliGRAM(s) Oral daily  gabapentin 100milliGRAM(s) Oral two times a day  atorvastatin 10milliGRAM(s) Oral at bedtime  buDESOnide 160 MICROgram(s)/formoterol 4.5 MICROgram(s) Inhaler 1Puff(s) Inhalation two times a day  docusate sodium 100milliGRAM(s) Oral daily  levothyroxine 112MICROGram(s) Oral daily  lactobacillus acidophilus 1Tablet(s) Oral daily  amLODIPine   Tablet 5milliGRAM(s) Oral daily  isosorbide   mononitrate ER Tablet (IMDUR) 30milliGRAM(s) Oral daily  levoFLOXacin  Tablet 750milliGRAM(s) Oral every 24 hours    MEDICATIONS  (PRN):  acetaminophen   Tablet. 650milliGRAM(s) Oral every 6 hours PRN Mild Pain (1 - 3)  traMADol 50milliGRAM(s) Oral two times a day PRN Moderate Pain (4 - 6)  magnesium hydroxide Suspension 30milliLiter(s) Oral daily PRN Constipation  nitroglycerin     SubLingual 0.3milliGRAM(s) SubLingual every 5 minutes PRN Chest Pain  zolpidem 5milliGRAM(s) Oral at bedtime PRN Insomnia      Allergies    adhesives (Unknown)  Codeine Phosphate (Other)  ferrous sulfate (Other)  penicillin (Other)    Intolerances        PHYSICAL EXAM:    Vital Signs Last 24 Hrs  T(C): 36.6, Max: 36.9 (03-06 @ 16:50)  T(F): 97.8, Max: 98.4 (03-06 @ 16:50)  HR: 79 (72 - 79)  BP: 132/64 (68/40 - 132/64)  BP(mean): --  RR: 18 (16 - 18)  SpO2: 98% (94% - 98%)    General: alert  oriented x __3__ full capacity to understand disease process    Karnofsky:  %    HEENT: normal      Lungs: comfortable     CV: normal      GI: normal   constipation  last BM:  3/4  suggest miralax  daily until BM then prn    :   incontinent      MSK:   weakness                  Skin: normal  pressure ulcers- Stage___1  scarum__    LABS:                        10.1   6.9   )-----------( 219      ( 05 Mar 2017 09:57 )             30.5     05 Mar 2017 09:57    127    |  89     |  12.0   ----------------------------<  114    4.7     |  17.0   |  0.59     Ca    8.7        05 Mar 2017 09:57          I&O's Summary  I & Os for 24h ending 06 Mar 2017 07:00  =============================================  IN: 360 ml / OUT: 0 ml / NET: 360 ml    I & Os for current day (as of 07 Mar 2017 06:33)  =============================================  IN: 800 ml / OUT: 900 ml / NET: -100 ml      RADIOLOGY & ADDITIONAL STUDIES:    ADVANCE DIRECTIVES:   DNR YES NO  Completed on:    3/7                 MOLST  YES NO   Completed on:3/7

## 2017-03-08 VITALS
DIASTOLIC BLOOD PRESSURE: 72 MMHG | RESPIRATION RATE: 16 BRPM | HEART RATE: 68 BPM | OXYGEN SATURATION: 98 % | SYSTOLIC BLOOD PRESSURE: 132 MMHG

## 2017-03-08 PROCEDURE — 80053 COMPREHEN METABOLIC PANEL: CPT

## 2017-03-08 PROCEDURE — 78452 HT MUSCLE IMAGE SPECT MULT: CPT

## 2017-03-08 PROCEDURE — 96374 THER/PROPH/DIAG INJ IV PUSH: CPT | Mod: XU

## 2017-03-08 PROCEDURE — 86900 BLOOD TYPING SEROLOGIC ABO: CPT

## 2017-03-08 PROCEDURE — 82550 ASSAY OF CK (CPK): CPT

## 2017-03-08 PROCEDURE — 71275 CT ANGIOGRAPHY CHEST: CPT

## 2017-03-08 PROCEDURE — 74174 CTA ABD&PLVS W/CONTRAST: CPT

## 2017-03-08 PROCEDURE — 83880 ASSAY OF NATRIURETIC PEPTIDE: CPT

## 2017-03-08 PROCEDURE — 71045 X-RAY EXAM CHEST 1 VIEW: CPT

## 2017-03-08 PROCEDURE — 86880 COOMBS TEST DIRECT: CPT

## 2017-03-08 PROCEDURE — 93005 ELECTROCARDIOGRAM TRACING: CPT

## 2017-03-08 PROCEDURE — 86870 RBC ANTIBODY IDENTIFICATION: CPT

## 2017-03-08 PROCEDURE — 93017 CV STRESS TEST TRACING ONLY: CPT

## 2017-03-08 PROCEDURE — A9500: CPT

## 2017-03-08 PROCEDURE — 94664 DEMO&/EVAL PT USE INHALER: CPT

## 2017-03-08 PROCEDURE — 87040 BLOOD CULTURE FOR BACTERIA: CPT

## 2017-03-08 PROCEDURE — 36415 COLL VENOUS BLD VENIPUNCTURE: CPT

## 2017-03-08 PROCEDURE — 84484 ASSAY OF TROPONIN QUANT: CPT

## 2017-03-08 PROCEDURE — 80048 BASIC METABOLIC PNL TOTAL CA: CPT

## 2017-03-08 PROCEDURE — 85610 PROTHROMBIN TIME: CPT

## 2017-03-08 PROCEDURE — 85730 THROMBOPLASTIN TIME PARTIAL: CPT

## 2017-03-08 PROCEDURE — 94640 AIRWAY INHALATION TREATMENT: CPT

## 2017-03-08 PROCEDURE — 97163 PT EVAL HIGH COMPLEX 45 MIN: CPT

## 2017-03-08 PROCEDURE — 94760 N-INVAS EAR/PLS OXIMETRY 1: CPT

## 2017-03-08 PROCEDURE — 84145 PROCALCITONIN (PCT): CPT

## 2017-03-08 PROCEDURE — 86850 RBC ANTIBODY SCREEN: CPT

## 2017-03-08 PROCEDURE — 99285 EMERGENCY DEPT VISIT HI MDM: CPT | Mod: 25

## 2017-03-08 PROCEDURE — 85379 FIBRIN DEGRADATION QUANT: CPT

## 2017-03-08 PROCEDURE — 99239 HOSP IP/OBS DSCHRG MGMT >30: CPT

## 2017-03-08 PROCEDURE — 85027 COMPLETE CBC AUTOMATED: CPT

## 2017-03-08 PROCEDURE — 86901 BLOOD TYPING SEROLOGIC RH(D): CPT

## 2017-03-08 RX ORDER — CIPROFLOXACIN LACTATE 400MG/40ML
1 VIAL (ML) INTRAVENOUS
Qty: 0 | Refills: 0 | COMMUNITY

## 2017-03-08 RX ORDER — CARVEDILOL PHOSPHATE 80 MG/1
1 CAPSULE, EXTENDED RELEASE ORAL
Qty: 0 | Refills: 0 | COMMUNITY

## 2017-03-08 RX ORDER — POTASSIUM CHLORIDE 20 MEQ
1 PACKET (EA) ORAL
Qty: 0 | Refills: 0 | COMMUNITY

## 2017-03-08 RX ORDER — CARVEDILOL PHOSPHATE 80 MG/1
3.12 CAPSULE, EXTENDED RELEASE ORAL EVERY 12 HOURS
Qty: 0 | Refills: 0 | Status: DISCONTINUED | OUTPATIENT
Start: 2017-03-08 | End: 2017-03-08

## 2017-03-08 RX ORDER — CARVEDILOL PHOSPHATE 80 MG/1
1 CAPSULE, EXTENDED RELEASE ORAL
Qty: 0 | Refills: 0 | COMMUNITY
Start: 2017-03-08

## 2017-03-08 RX ADMIN — Medication 100 MILLIGRAM(S): at 12:20

## 2017-03-08 RX ADMIN — Medication 81 MILLIGRAM(S): at 12:20

## 2017-03-08 RX ADMIN — GABAPENTIN 100 MILLIGRAM(S): 400 CAPSULE ORAL at 06:15

## 2017-03-08 RX ADMIN — ISOSORBIDE MONONITRATE 30 MILLIGRAM(S): 60 TABLET, EXTENDED RELEASE ORAL at 12:20

## 2017-03-08 RX ADMIN — Medication 1 TABLET(S): at 12:20

## 2017-03-08 RX ADMIN — Medication 0.12 MILLIGRAM(S): at 06:15

## 2017-03-08 RX ADMIN — Medication 112 MICROGRAM(S): at 06:15

## 2017-03-08 NOTE — PROGRESS NOTE ADULT - SUBJECTIVE AND OBJECTIVE BOX
Patient is a 76y old  Female who presents with a chief complaint of chest pain (02 Mar 2017 16:59)  s/p nuclear stress test  no CP  stts feeling better     ANTIMICROBIAL:  meropenem IVPB 500milliGRAM(s) IV Intermittent every 8 hours    CARDIOVASCULAR:  nitroglycerin     SubLingual 0.3milliGRAM(s) SubLingual every 5 minutes PRN  digoxin     Tablet 0.125milliGRAM(s) Oral daily  carvedilol 6.25milliGRAM(s) Oral every 12 hours  amLODIPine   Tablet 5milliGRAM(s) Oral daily  isosorbide   mononitrate ER Tablet (IMDUR) 30milliGRAM(s) Oral daily  furosemide    Tablet 40milliGRAM(s) Oral daily    PULMONARY:  buDESOnide 160 MICROgram(s)/formoterol 4.5 MICROgram(s) Inhaler 1Puff(s) Inhalation two times a day    NEUROLOGIC:  acetaminophen   Tablet. 650milliGRAM(s) Oral every 6 hours PRN  traMADol 50milliGRAM(s) Oral two times a day PRN  gabapentin 100milliGRAM(s) Oral two times a day  zolpidem 5milliGRAM(s) Oral at bedtime PRN      HEMATOLOGIC:  aspirin enteric coated 81milliGRAM(s) Oral daily    GATROINTESTINAL:  magnesium hydroxide Suspension 30milliLiter(s) Oral daily PRN  docusate sodium 100milliGRAM(s) Oral daily    ENDO/METABOLIC:  atorvastatin 10milliGRAM(s) Oral at bedtime  levothyroxine 112MICROGram(s) Oral daily    IV FLUID/NUTRITION:  potassium chloride    Tablet ER 10milliEquivalent(s) Oral daily    TOPICAL:    IMMUNOLOGIC & OTHER  lactobacillus acidophilus 1Tablet(s) Oral daily      Allergies    adhesives (Unknown)  Codeine Phosphate (Other)  ferrous sulfate (Other)  penicillin (Other)      Vital Signs Last 24 Hrs  T(C): 36.6, Max: 36.7 (03-03 @ 22:08)  T(F): 97.8, Max: 98.1 (03-03 @ 22:08)  HR: 70 (66 - 75)  BP: 112/60 (104/62 - 112/60)  BP(mean): --  RR: 18 (16 - 18)  SpO2: 97% (95% - 100%)        REVIEW OF SYSTEMS:    CONSTITUTIONAL: No fever, weight loss, or fatigue  EYES: No eye pain, visual disturbances, or discharge  ENMT:  No difficulty hearing, tinnitus, vertigo; No sinus or throat pain  NECK: No pain or stiffness  RESPIRATORY: No cough, wheezing, chills or hemoptysis; No shortness of breath  CARDIOVASCULAR: No chest pain, palpitations, dizziness, or leg swelling  GASTROINTESTINAL: No abdominal or epigastric pain. No nausea, vomiting, or hematemesis; No diarrhea or constipation. No melena or hematochezia.  GENITOURINARY: No dysuria, frequency, hematuria, or incontinence  NEUROLOGICAL: No headaches, memory loss, loss of strength, numbness, or tremors  SKIN: No itching, burning, rashes, or lesions         PHYSICAL EXAM:    GENERAL: NAD, well-groomed, well-developed  HEAD:  Atraumatic, Normocephalic  EYES: EOMI, PERRLA, conjunctiva and sclera clear  ENMT: No tonsillar erythema, exudates, or enlargement; Moist mucous membranes, Good dentition, No lesions  NECK: Supple, No JVD, Normal thyroid  NERVOUS SYSTEM:  Alert & Oriented X3, Good concentration; Motor Strength 5/5 B/L upper and lower extremities; DTRs 2+ intact and symmetric  CHEST/LUNG: Clear to percussion bilaterally; No rales, rhonchi, wheezing, or rubs  HEART: Regular +murmur3/6  ABDOMEN: Soft, Nontender, Nondistended; Bowel sounds present  EXTREMITIES:  2+ Peripheral Pulses, No clubbing, cyanosis, or edema        LABS:                        8.9    5.9   )-----------( 301      ( 04 Mar 2017 05:35 )             28.1     CBC Full  -  ( 04 Mar 2017 05:35 )  WBC Count : 5.9 K/uL  Hemoglobin : 8.9 g/dL  Hematocrit : 28.1 %  Platelet Count - Automated : 301 K/uL  Mean Cell Volume : 88.4 fl  Mean Cell Hemoglobin : 28.0 pg  Mean Cell Hemoglobin Concentration : 31.7 g/dL  Auto Neutrophil # : 4.1 K/uL  Auto Lymphocyte # : 0.8 K/uL  Auto Monocyte # : 0.8 K/uL  Auto Eosinophil # : 0.1 K/uL  Auto Basophil # : 0.0 K/uL  Auto Neutrophil % : 69.9 %  Auto Lymphocyte % : 14.1 %  Auto Monocyte % : 13.8 %  Auto Eosinophil % : 1.2 %  Auto Basophil % : 0.5 %    04 Mar 2017 05:35    131    |  93     |  14.0   ----------------------------<  99     4.2     |  29.0   |  0.60     Ca    8.3        04 Mar 2017 05:35    TPro  7.1    /  Alb  2.2    /  TBili  0.3    /  DBili  x      /  AST  9      /  ALT  7      /  AlkPhos  69     04 Mar 2017 05:35          Culture Results:   No growth at 48 hours (03-02 @ 11:41)  Culture Results:   No growth at 48 hours (03-02 @ 11:40)    Serum Pro-Brain Natriuretic Peptide: 3089 pg/mL (03-02 @ 03:30)      Albumin, Serum: 2.2 g/dL (03-04 @ 05:35)    LIVER FUNCTIONS - ( 04 Mar 2017 05:35 )  Alb: 2.2 g/dL / Pro: 7.1 g/dL / ALK PHOS: 69 U/L / ALT: 7 U/L / AST: 9 U/L / GGT: x             RADIOLOGY & ADDITIONAL TESTS:
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 pain is sharp on and off for more than 2 weeks. no clear precipitating factors. pain is not related to breathing, eating, movements or effort. pain is not associated with nausea or diaphoresis. pain may last < 5 min. CTA chest and abdomen showed no significant change form prior study 2 weeks ago.   PT ADMITTED WITH POSSIBLE PNEUMONIA  TREATED WITH IV ABX  CLINICALLY IMPROVED    Allergies:  adhesives (Unknown)  Codeine Phosphate (Other)  ferrous sulfate (Other)  penicillin (Other)      Medications:  acetaminophen   Tablet. 650milliGRAM(s) Oral every 6 hours PRN  aspirin enteric coated 81milliGRAM(s) Oral daily  traMADol 50milliGRAM(s) Oral two times a day PRN  magnesium hydroxide Suspension 30milliLiter(s) Oral daily PRN  nitroglycerin     SubLingual 0.3milliGRAM(s) SubLingual every 5 minutes PRN  digoxin     Tablet 0.125milliGRAM(s) Oral daily  gabapentin 100milliGRAM(s) Oral two times a day  atorvastatin 10milliGRAM(s) Oral at bedtime  zolpidem 5milliGRAM(s) Oral at bedtime PRN  carvedilol 6.25milliGRAM(s) Oral every 12 hours  buDESOnide 160 MICROgram(s)/formoterol 4.5 MICROgram(s) Inhaler 1Puff(s) Inhalation two times a day  docusate sodium 100milliGRAM(s) Oral daily  potassium chloride    Tablet ER 10milliEquivalent(s) Oral daily  levothyroxine 112MICROGram(s) Oral daily  lactobacillus acidophilus 1Tablet(s) Oral daily  amLODIPine   Tablet 5milliGRAM(s) Oral daily  isosorbide   mononitrate ER Tablet (IMDUR) 30milliGRAM(s) Oral daily  furosemide    Tablet 40milliGRAM(s) Oral daily  meropenem IVPB 500milliGRAM(s) IV Intermittent every 8 hours      ANTIBIOTICS: MERREM        Review of Systems: - Negative except as mentioned above     Physical Exam:  ICU Vital Signs Last 24 Hrs  T(C): 36.9, Max: 36.9 (03-05 @ 11:15)  T(F): 98.5, Max: 98.5 (03-05 @ 11:15)  HR: 69 (62 - 69)  BP: 120/62 (118/62 - 129/65)  BP(mean): --  ABP: --  ABP(mean): --  RR: 17 (16 - 18)  SpO2: 98% (96% - 98%)    GEN: NAD, pleasant  HEENT: normocephalic and atraumatic. EOMI. HOMERO...  NECK: Supple. No carotid bruits.  No lymphadenopathy or thyromegaly.  LUNGS: Clear to auscultation.  HEART: Regular rate and rhythm without murmur.  ABDOMEN: Soft, nontender, and nondistended.  Positive bowel sounds.  No hepatosplenomegaly was noted.  NO REBOUND NO GUARDING  EXTREMITIES: Without any cyanosis, clubbing, rash, lesions or edema.  NEUROLOGIC: Cranial nerves II through XII are grossly intact.    SKIN: No ulceration or induration present.      Labs:  04 Mar 2017 05:35    131    |  93     |  14.0   ----------------------------<  99     4.2     |  29.0   |  0.60     Ca    8.3        04 Mar 2017 05:35    TPro  7.1    /  Alb  2.2    /  TBili  0.3    /  DBili  x      /  AST  9      /  ALT  7      /  AlkPhos  69     04 Mar 2017 05:35                          10.1   6.9   )-----------( 219      ( 05 Mar 2017 09:57 )             30.5           LIVER FUNCTIONS - ( 04 Mar 2017 05:35 )  Alb: 2.2 g/dL / Pro: 7.1 g/dL / ALK PHOS: 69 U/L / ALT: 7 U/L / AST: 9 U/L / GGT: x               CAPILLARY BLOOD GLUCOSE        RECENT CULTURES:  03-02 .Blood Blood XXXX XXXX   No growth at 48 hours    03-02 .Blood Blood XXXX XXXX   No growth at 48 hours
[facility]                                   [address] [address]                                                      PHONE: [phone]                                                         FAX: [phone]  ------------------------------------------------------------------------------------------------------------------------------    Pt seen and examined. FU for chest pain    Overnight events/patient complaints: Pt reports fatigue. Has not ambulated.   Vital Signs Last 24 Hrs  T(C): 36.6, Max: 36.7 (03-03 @ 22:08)  T(F): 97.8, Max: 98.1 (03-03 @ 22:08)  HR: 70 (66 - 75)  BP: 112/60 (104/62 - 128/60)  BP(mean): --  RR: 18 (16 - 18)  SpO2: 97% (95% - 100%)  I&O's Summary      PHYSICAL EXAM:  Constitutional: Oriented to time, place and person. Appears well developed, well nourished; alert and co-operative  HEENT:     Conjunctiva normal, Normal oral mucosa, No JVD	  Cardiovascular: Normal S1 S2, No murmurs  Respiratory: Lungs clear to auscultation; + crepitations, no wheeze  Gastrointestinal:  Soft, Non-tender, + BS	  Extremities: No cyanosis, clubbing or edema  Skin: Warm and dry  Neurologic: Alert oriented to time place and person  Psychiatric: affect was normal        LABS:                        8.9    5.9   )-----------( 301      ( 04 Mar 2017 05:35 )             28.1     04 Mar 2017 05:35    131    |  93     |  14.0   ----------------------------<  99     4.2     |  29.0   |  0.60     Ca    8.3        04 Mar 2017 05:35    TPro  7.1    /  Alb  2.2    /  TBili  0.3    /  DBili  x      /  AST  9      /  ALT  7      /  AlkPhos  69     04 Mar 2017 05:35      CARDIOLOGY TESTING:     Echocardiogram:   1. Left ventricular ejection fraction, by visual estimation, is 40 to   45%.   2. Mildly to moderately decreased global left ventricular systolic   function.   3. Spectral Doppler shows impaired relaxation pattern of left   ventricular myocardial filling (Grade I diastolic dysfunction).   4. Normal left ventricular internal cavity size.   5. There is mild concentric left ventricular hypertrophy.   6. Normal right ventricular size and function.   7. The left atrium is normal in size.   8. The right atrium is normal in size.   9. Mild to moderate mitral annular calcification.  10. Thickening of the anterior and posterior mitral valve leaflets.  11. Mild mitral valve regurgitation.  12. Bioprosthesis in the aortic position demosntrating normal function.  13. Mild aortic regurgitation.  14. Prosthetic graft in the ascending aorta position. No evidence of leak   in limited views of the ascending aortic prosthesis.  15. Small pericardial effusion.    Stress test:  Conclusion:  The left ventricle was normal in size. Normal myocardial  perfusion scan, with no evidence of infarction or  inducible ischemia.    Cardiac Catheterization:    RADIOLOGY & ADDITIONAL STUDIES:    MEDICATIONS:  MEDICATIONS  (STANDING):  aspirin enteric coated 81milliGRAM(s) Oral daily  digoxin     Tablet 0.125milliGRAM(s) Oral daily  gabapentin 100milliGRAM(s) Oral two times a day  atorvastatin 10milliGRAM(s) Oral at bedtime  carvedilol 6.25milliGRAM(s) Oral every 12 hours  buDESOnide 160 MICROgram(s)/formoterol 4.5 MICROgram(s) Inhaler 1Puff(s) Inhalation two times a day  docusate sodium 100milliGRAM(s) Oral daily  potassium chloride    Tablet ER 10milliEquivalent(s) Oral daily  levothyroxine 112MICROGram(s) Oral daily  lactobacillus acidophilus 1Tablet(s) Oral daily  amLODIPine   Tablet 5milliGRAM(s) Oral daily  isosorbide   mononitrate ER Tablet (IMDUR) 30milliGRAM(s) Oral daily  furosemide    Tablet 40milliGRAM(s) Oral daily  meropenem IVPB 500milliGRAM(s) IV Intermittent every 8 hours    MEDICATIONS  (PRN):  acetaminophen   Tablet. 650milliGRAM(s) Oral every 6 hours PRN Mild Pain (1 - 3)  traMADol 50milliGRAM(s) Oral two times a day PRN Moderate Pain (4 - 6)  magnesium hydroxide Suspension 30milliLiter(s) Oral daily PRN Constipation  nitroglycerin     SubLingual 0.3milliGRAM(s) SubLingual every 5 minutes PRN Chest Pain  zolpidem 5milliGRAM(s) Oral at bedtime PRN Insomnia
Patient is a 76y old  Female who presents with a chief complaint of chest pain   pt hypotensive yesterday and today  also + orthostatics  d/c all BP meds for now         ANTIMICROBIAL:  levoFLOXacin  Tablet 750milliGRAM(s) Oral every 24 hours    CARDIOVASCULAR:  nitroglycerin     SubLingual 0.3milliGRAM(s) SubLingual every 5 minutes PRN  digoxin     Tablet 0.125milliGRAM(s) Oral daily  amLODIPine   Tablet 5milliGRAM(s) Oral daily  isosorbide   mononitrate ER Tablet (IMDUR) 30milliGRAM(s) Oral daily    PULMONARY:  buDESOnide 160 MICROgram(s)/formoterol 4.5 MICROgram(s) Inhaler 1Puff(s) Inhalation two times a day    NEUROLOGIC:  acetaminophen   Tablet. 650milliGRAM(s) Oral every 6 hours PRN  traMADol 50milliGRAM(s) Oral two times a day PRN  gabapentin 100milliGRAM(s) Oral two times a day  zolpidem 5milliGRAM(s) Oral at bedtime PRN      HEMATOLOGIC:  aspirin enteric coated 81milliGRAM(s) Oral daily    GATROINTESTINAL:  magnesium hydroxide Suspension 30milliLiter(s) Oral daily PRN  docusate sodium 100milliGRAM(s) Oral daily     MEDS:    ENDO/METABOLIC:  atorvastatin 10milliGRAM(s) Oral at bedtime  levothyroxine 112MICROGram(s) Oral daily        Allergies    adhesives (Unknown)  Codeine Phosphate (Other)  ferrous sulfate (Other)  penicillin (Other)    Intolerances        Vital Signs Last 24 Hrs  T(C): 36.3, Max: 36.9 (03-06 @ 16:50)  T(F): 97.3, Max: 98.4 (03-06 @ 16:50)  HR: 71 (63 - 79)  BP: 98/60 (70/50 - 132/64)  BP(mean): --  RR: 16 (15 - 18)  SpO2: 98% (95% - 98%)            REVIEW OF SYSTEMS:    CONSTITUTIONAL: No fever, weight loss, or fatigue  EYES: No eye pain, visual disturbances, or discharge  ENMT:  No difficulty hearing, tinnitus, vertigo; No sinus or throat pain  NECK: No pain or stiffness  RESPIRATORY: No cough, wheezing, chills or hemoptysis; No shortness of breath  CARDIOVASCULAR: No chest pain, palpitations, dizziness, or leg swelling  GASTROINTESTINAL: No abdominal or epigastric pain. No nausea, vomiting, or hematemesis; No diarrhea or constipation. No melena or hematochezia.  NEUROLOGICAL: No headaches, memory loss,        PHYSICAL EXAM:    EYES: EOMI, PERRLA, conjunctiva and sclera clear  ENMT: No tonsillar erythema, exudates, or enlargement; Moist mucous membranes, Good dentition, No lesions  NECK: Supple, No JVD, Normal thyroid  NERVOUS SYSTEM:  Alert & Oriented X3, Good concentration;   CHEST/LUNG: Clear to percussion bilaterally; No rales, rhonchi, wheezing, or rubs  HEART: Regular rate and rhythm;  ABDOMEN: Soft, Nontender, Nondistended; Bowel sounds present  EXTREMITIES:  2+ Peripheral Pulses, No clubbing, cyanosis, or edema        Culture Results:   No growth at 48 hours (03-02 @ 11:41)  Culture Results:   No growth at 48 hours (03-02 @ 11:40)    Procalcitonin, Serum: <0.23 ng/mL (03-05 @ 12:31)
Patient is a 76y old  Female who presents with a chief complaint of chest pain  Stts feeling better today. In nuclear stress test this morning therefore physical exam very limited       ANTIMICROBIAL:  meropenem IVPB 500milliGRAM(s) IV Intermittent every 8 hours    CARDIOVASCULAR:  nitroglycerin     SubLingual 0.3milliGRAM(s) SubLingual every 5 minutes PRN  digoxin     Tablet 0.125milliGRAM(s) Oral daily  carvedilol 6.25milliGRAM(s) Oral every 12 hours  amLODIPine   Tablet 5milliGRAM(s) Oral daily  isosorbide   mononitrate ER Tablet (IMDUR) 30milliGRAM(s) Oral daily  furosemide    Tablet 40milliGRAM(s) Oral daily    PULMONARY:  buDESOnide 160 MICROgram(s)/formoterol 4.5 MICROgram(s) Inhaler 1Puff(s) Inhalation two times a day    NEUROLOGIC:  acetaminophen   Tablet. 650milliGRAM(s) Oral every 6 hours PRN  traMADol 50milliGRAM(s) Oral two times a day PRN  gabapentin 100milliGRAM(s) Oral two times a day  zolpidem 5milliGRAM(s) Oral at bedtime PRN    ONCOLOGIC:    HEMATOLOGIC:  aspirin enteric coated 81milliGRAM(s) Oral daily    GATROINTESTINAL:  magnesium hydroxide Suspension 30milliLiter(s) Oral daily PRN  docusate sodium 100milliGRAM(s) Oral daily     MEDS:    ENDO/METABOLIC:  atorvastatin 10milliGRAM(s) Oral at bedtime  levothyroxine 112MICROGram(s) Oral daily    IV FLUID/NUTRITION:  potassium chloride    Tablet ER 10milliEquivalent(s) Oral daily      IMMUNOLOGIC & OTHER  lactobacillus acidophilus 1Tablet(s) Oral daily      Allergies    adhesives (Unknown)  Codeine Phosphate (Other)  ferrous sulfate (Other)  penicillin (Other)    Intolerances        Vital Signs Last 24 Hrs  T(C): 36.4, Max: 36.5 (- @ 15:36)  T(F): 97.5, Max: 97.7 (- @ 15:36)  HR: 72 (67 - 76)  BP: 142/62 (90/50 - 142/62)  BP(mean): --  RR: 17 (16 - 17)  SpO2: 99% (90% - 100%)      Daily     Daily Weight in k.1 (02 Mar 2017 16:59)  I&O's Detail    I & Os for current day (as of 03 Mar 2017 08:42)  =============================================  IN:    Oral Fluid: 660 ml    Solution: 300 ml    Total IN: 960 ml  ---------------------------------------------  OUT:    Voided: 100 ml    Total OUT: 100 ml  ---------------------------------------------  Total NET: 860 ml    Last Menstrual Period        REVIEW OF SYSTEMS:    CONSTITUTIONAL: No fever, weight loss, or fatigue  EYES: No eye pain, visual disturbances, or discharge  ENMT:  No difficulty hearing, tinnitus, vertigo; No sinus or throat pain  NECK: No pain or stiffness  RESPIRATORY: No cough, wheezing, chills or hemoptysis; No shortness of breath  CARDIOVASCULAR: No chest pain, palpitations, dizziness, or leg swelling  GASTROINTESTINAL: No abdominal or epigastric pain. No nausea, vomiting, or hematemesis; No diarrhea or constipation. No melena or hematochezia.  GENITOURINARY: No dysuria, frequency, hematuria, or incontinence  NEUROLOGICAL: No headaches, memory loss, loss of strength, numbness, or tremors  SKIN: No itching, burning, rashes, or lesions         PHYSICAL EXAM:    GENERAL: NAD, well-groomed, well-developed  HEAD:  Atraumatic, Normocephalic  EYES: EOMI, PERRLA, conjunctiva and sclera clear  ENMT: No tonsillar erythema, exudates, or enlargement; Moist mucous membranes, Good dentition, No lesions  NECK: Supple, No JVD, Normal thyroid  NERVOUS SYSTEM:  Alert & Oriented X3  CHEST/LUNG: Clear to percussion bilaterally; No rales, rhonchi, wheezing, or rubs  HEART: Regular rate and rhythm; No murmurs, rubs, or gallops  ABDOMEN: Soft, Nontender, Nondistended; Bowel sounds present  EXTREMITIES:  2+ Peripheral Pulses, No clubbing, cyanosis, or edema        LABS:                        8.4    8.7   )-----------( 314      ( 01 Mar 2017 12:48 )             26.3     CBC Full  -  ( 01 Mar 2017 12:48 )  WBC Count : 8.7 K/uL  Hemoglobin : 8.4 g/dL  Hematocrit : 26.3 %  Platelet Count - Automated : 314 K/uL      01 Mar 2017 12:48    129    |  91     |  12.0   ----------------------------<  102    5.0     |  30.0   |  0.53     Ca    8.4        01 Mar 2017 12:48    TPro  7.0    /  Alb  2.4    /  TBili  0.3    /  DBili  x      /  AST  13     /  ALT  9      /  AlkPhos  65     01 Mar 2017 12:48    PT/INR - ( 01 Mar 2017 12:48 )   PT: 15.5 sec;   INR: 1.40 ratio         PTT - ( 01 Mar 2017 12:48 )  PTT:32.7 sec      Culture Results:   Rare Routine respiratory jenae present  Moderate Candida albicans ( @ 17:03)    Serum Pro-Brain Natriuretic Peptide: 3089 pg/mL ( @ 03:30)        LIVER FUNCTIONS - ( 01 Mar 2017 12:48 )  Alb: 2.4 g/dL / Pro: 7.0 g/dL / ALK PHOS: 65 U/L / ALT: 9 U/L / AST: 13 U/L / GGT: x             RADIOLOGY & ADDITIONAL TESTS:
Patient is a 76y old  Female who presents with a chief complaint of chest pain  reviewed CTA with radiology -- NO PE, bilat upper lobe PNA and bilat pleural effusions      ANTIMICROBIAL:  levoFLOXacin IVPB 750milliGRAM(s) IV Intermittent every 24 hours    CARDIOVASCULAR:  nitroglycerin     SubLingual 0.3milliGRAM(s) SubLingual every 5 minutes PRN  digoxin     Tablet 0.125milliGRAM(s) Oral daily  carvedilol 6.25milliGRAM(s) Oral every 12 hours  amLODIPine   Tablet 10milliGRAM(s) Oral daily  furosemide    Tablet 20milliGRAM(s) Oral daily    PULMONARY:  buDESOnide 160 MICROgram(s)/formoterol 4.5 MICROgram(s) Inhaler 1Puff(s) Inhalation two times a day    NEUROLOGIC:  acetaminophen   Tablet. 650milliGRAM(s) Oral every 6 hours PRN  traMADol 50milliGRAM(s) Oral two times a day PRN  gabapentin 100milliGRAM(s) Oral two times a day  zolpidem 5milliGRAM(s) Oral at bedtime PRN    ONCOLOGIC:    HEMATOLOGIC:  aspirin enteric coated 81milliGRAM(s) Oral daily    GATROINTESTINAL:  magnesium hydroxide Suspension 30milliLiter(s) Oral daily PRN  docusate sodium 100milliGRAM(s) Oral daily      ENDO/METABOLIC:  atorvastatin 10milliGRAM(s) Oral at bedtime  levothyroxine 112MICROGram(s) Oral daily    IV FLUID/NUTRITION:  potassium chloride    Tablet ER 10milliEquivalent(s) Oral daily    TOPICAL:    IMMUNOLOGIC & OTHER  lactobacillus acidophilus 1Tablet(s) Oral daily      Allergies    adhesives (Unknown)  Codeine Phosphate (Other)  ferrous sulfate (Other)  penicillin (Other)      Vital Signs Last 24 Hrs  T(C): 36.6, Max: 36.8 ( @ 10:43)  T(F): 97.9, Max: 98.3 ( @ 10:43)  HR: 71 (63 - 79)  BP: 132/68 (101/50 - 140/70)  BP(mean): --  RR: 17 (17 - 18)  SpO2: 95% (94% - 95%)      Daily Height in cm: 162.56 (01 Mar 2017 10:43)    Daily Weight in k.1 (02 Mar 2017 06:08)  I&O's Detail    I & Os for current day (as of 02 Mar 2017 08:39)  =============================================  IN:    Oral Fluid: 120 ml    Total IN: 120 ml  ---------------------------------------------  OUT:    Total OUT: 0 ml  ---------------------------------------------  Total NET: 120 ml          REVIEW OF SYSTEMS:    all negative except those mentioned in the HPI       PHYSICAL EXAM:    GENERAL: NAD, well-groomed, well-developed  HEAD:  Atraumatic, Normocephalic  EYES: EOMI, PERRLA, conjunctiva and sclera clear  ENMT: No tonsillar erythema, exudates, or enlargement; Moist mucous membranes, Good dentition, No lesions  NECK: Supple, No JVD, Normal thyroid  NERVOUS SYSTEM:  Alert & Oriented X3,   CHEST/LUNG: Clear Bilat. no wheezing no rhonchi   HEART: Regular rate and rhythm; +3murmur  ABDOMEN: Soft, Nontender, Nondistended; Bowel sounds present  EXTREMITIES:  2+ Peripheral Pulses, No clubbing, cyanosis, or edema      LABS:                        8.4    8.7   )-----------( 314      ( 01 Mar 2017 12:48 )             26.3     CBC Full  -  ( 01 Mar 2017 12:48 )  WBC Count : 8.7 K/uL  Hemoglobin : 8.4 g/dL  Hematocrit : 26.3 %  Platelet Count - Automated : 314 K/uL  Mean Cell Volume : 89.2 fl  Mean Cell Hemoglobin : 28.5 pg  Mean Cell Hemoglobin Concentration : 31.9 g/dL  Auto Neutrophil # : 7.0 K/uL  Auto Lymphocyte # : 0.6 K/uL  Auto Monocyte # : 0.8 K/uL  Auto Eosinophil # : 0.1 K/uL  Auto Basophil # : x  Auto Neutrophil % : 80.0 %  Auto Lymphocyte % : 8.0 %  Auto Monocyte % : 8.0 %  Auto Eosinophil % : 2.0 %  Auto Basophil % : x    01 Mar 2017 12:48    129    |  91     |  12.0   ----------------------------<  102    5.0     |  30.0   |  0.53     Ca    8.4        01 Mar 2017 12:48    TPro  7.0    /  Alb  2.4    /  TBili  0.3    /  DBili  x      /  AST  13     /  ALT  9      /  AlkPhos  65     01 Mar 2017 12:48    PT/INR - ( 01 Mar 2017 12:48 )   PT: 15.5 sec;   INR: 1.40 ratio         PTT - ( 01 Mar 2017 12:48 )  PTT:32.7 sec      Culture Results:   Rare Routine respiratory jenae present  Moderate Candida albicans ( @ 17:03)    Serum Pro-Brain Natriuretic Peptide: 3089 pg/mL ( @ 03:30)      D-Dimer Assay, Quantitative: 3635 ng/mL DDU ( @ 03:30)  Albumin, Serum: 2.4 g/dL ( @ 12:48)    LIVER FUNCTIONS - ( 01 Mar 2017 12:48 )  Alb: 2.4 g/dL / Pro: 7.0 g/dL / ALK PHOS: 65 U/L / ALT: 9 U/L / AST: 13 U/L / GGT: x             RADIOLOGY & ADDITIONAL TESTS:
Patient is a 76y old  Female who presents with a chief complaint of chest pain (02 Mar 2017 16:59)  doing well   no complaints       CARDIOVASCULAR:  nitroglycerin     SubLingual 0.3milliGRAM(s) SubLingual every 5 minutes PRN  digoxin     Tablet 0.125milliGRAM(s) Oral daily  carvedilol 6.25milliGRAM(s) Oral every 12 hours  amLODIPine   Tablet 5milliGRAM(s) Oral daily  isosorbide   mononitrate ER Tablet (IMDUR) 30milliGRAM(s) Oral daily  furosemide    Tablet 40milliGRAM(s) Oral daily    PULMONARY:  buDESOnide 160 MICROgram(s)/formoterol 4.5 MICROgram(s) Inhaler 1Puff(s) Inhalation two times a day    NEUROLOGIC:  acetaminophen   Tablet. 650milliGRAM(s) Oral every 6 hours PRN  traMADol 50milliGRAM(s) Oral two times a day PRN  gabapentin 100milliGRAM(s) Oral two times a day  zolpidem 5milliGRAM(s) Oral at bedtime PRN    ONCOLOGIC:    HEMATOLOGIC:  aspirin enteric coated 81milliGRAM(s) Oral daily    GATROINTESTINAL:  magnesium hydroxide Suspension 30milliLiter(s) Oral daily PRN  docusate sodium 100milliGRAM(s) Oral daily     MEDS:    ENDO/METABOLIC:  atorvastatin 10milliGRAM(s) Oral at bedtime  levothyroxine 112MICROGram(s) Oral daily    IV FLUID/NUTRITION:  potassium chloride    Tablet ER 10milliEquivalent(s) Oral daily    TOPICAL:    IMMUNOLOGIC & OTHER  lactobacillus acidophilus 1Tablet(s) Oral daily      Allergies    adhesives (Unknown)  Codeine Phosphate (Other)  ferrous sulfate (Other)  penicillin (Other)    Intolerances        Vital Signs Last 24 Hrs  T(C): 36.7, Max: 36.9 (03-05 @ 11:15)  T(F): 98, Max: 98.5 (03-05 @ 11:15)  HR: 75 (65 - 75)  BP: 128/62 (116/60 - 128/68)  BP(mean): --  RR: 16 (16 - 18)  SpO2: 100% (96% - 100%)              REVIEW OF SYSTEMS:    CONSTITUTIONAL: No fever, weight loss, or fatigue  EYES: No eye pain, visual disturbances, or discharge  ENMT:  No difficulty hearing, tinnitus, vertigo; No sinus or throat pain  NECK: No pain or stiffness  RESPIRATORY: No cough, wheezing, chills or hemoptysis; No shortness of breath  CARDIOVASCULAR: No chest pain, palpitations, dizziness, or leg swelling  GASTROINTESTINAL: No abdominal or epigastric pain. No nausea, vomiting, or hematemesis; No diarrhea or constipation. No melena or hematochezia.  GENITOURINARY: No dysuria, frequency, hematuria, or incontinence  NEUROLOGICAL: No headaches, memory loss, loss of strength, numbness, or tremors        PHYSICAL EXAM:    GENERAL: NAD, well-groomed, well-developed  HEAD:  Atraumatic, Normocephalic  EYES: EOMI, PERRLA, conjunctiva and sclera clear  ENMT: No tonsillar erythema, exudates, or enlargement; Moist mucous membranes, Good dentition, No lesions  NECK: Supple, No JVD, Normal thyroid  NERVOUS SYSTEM:  Alert & Oriented X3, Good concentration; Motor Strength 5/5 B/L upper and lower extremities; DTRs 2+ intact and symmetric  CHEST/LUNG: Clear to percussion bilaterally; No rales, rhonchi, wheezing, or rubs  HEART: Regular rate and rhythm;   ABDOMEN: Soft, Nontender, Nondistended; Bowel sounds present  EXTREMITIES:  2+ Peripheral Pulses, No clubbing, cyanosis, or edema        LABS:                        10.1   6.9   )-----------( 219      ( 05 Mar 2017 09:57 )             30.5     CBC Full  -  ( 05 Mar 2017 09:57 )  WBC Count : 6.9 K/uL  Hemoglobin : 10.1 g/dL  Hematocrit : 30.5 %  Platelet Count - Automated : 219 K/uL  Mean Cell Volume : 86.6 fl  Mean Cell Hemoglobin : 28.7 pg  Mean Cell Hemoglobin Concentration : 33.1 g/dL  Auto Neutrophil # : x  Auto Lymphocyte # : x  Auto Monocyte # : x  Auto Eosinophil # : x  Auto Basophil # : x  Auto Neutrophil % : x  Auto Lymphocyte % : x  Auto Monocyte % : x  Auto Eosinophil % : x  Auto Basophil % : x    05 Mar 2017 09:57    127    |  89     |  12.0   ----------------------------<  114    4.7     |  17.0   |  0.59     Ca    8.7        05 Mar 2017 09:57            Culture Results:   No growth at 48 hours (03-02 @ 11:41)  Culture Results:   No growth at 48 hours (03-02 @ 11:40)    Procalcitonin, Serum: <0.23 ng/mL (03-05 @ 12:31)            RADIOLOGY & ADDITIONAL TESTS:
Patient is a 76y old  Female who presents with a chief complaint of chest pain (02 Mar 2017 16:59)  family at bedside and pt doing well       ANTIMICROBIAL:  meropenem IVPB 500milliGRAM(s) IV Intermittent every 8 hours    CARDIOVASCULAR:  nitroglycerin     SubLingual 0.3milliGRAM(s) SubLingual every 5 minutes PRN  digoxin     Tablet 0.125milliGRAM(s) Oral daily  carvedilol 6.25milliGRAM(s) Oral every 12 hours  amLODIPine   Tablet 5milliGRAM(s) Oral daily  isosorbide   mononitrate ER Tablet (IMDUR) 30milliGRAM(s) Oral daily  furosemide    Tablet 40milliGRAM(s) Oral daily    PULMONARY:  buDESOnide 160 MICROgram(s)/formoterol 4.5 MICROgram(s) Inhaler 1Puff(s) Inhalation two times a day    NEUROLOGIC:  acetaminophen   Tablet. 650milliGRAM(s) Oral every 6 hours PRN  traMADol 50milliGRAM(s) Oral two times a day PRN  gabapentin 100milliGRAM(s) Oral two times a day  zolpidem 5milliGRAM(s) Oral at bedtime PRN    ONCOLOGIC:    HEMATOLOGIC:  aspirin enteric coated 81milliGRAM(s) Oral daily    GATROINTESTINAL:  magnesium hydroxide Suspension 30milliLiter(s) Oral daily PRN  docusate sodium 100milliGRAM(s) Oral daily     MEDS:    ENDO/METABOLIC:  atorvastatin 10milliGRAM(s) Oral at bedtime  levothyroxine 112MICROGram(s) Oral daily    IV FLUID/NUTRITION:  potassium chloride    Tablet ER 10milliEquivalent(s) Oral daily    TOPICAL:    IMMUNOLOGIC & OTHER  lactobacillus acidophilus 1Tablet(s) Oral daily      Allergies    adhesives (Unknown)  Codeine Phosphate (Other)  ferrous sulfate (Other)  penicillin (Other)    Intolerances        Vital Signs Last 24 Hrs  T(C): 36.9, Max: 36.9 (03-05 @ 11:15)  T(F): 98.5, Max: 98.5 (03-05 @ 11:15)  HR: 69 (62 - 69)  BP: 120/62 (118/62 - 129/65)  BP(mean): --  RR: 17 (16 - 18)  SpO2: 98% (96% - 98%)          REVIEW OF SYSTEMS:    CONSTITUTIONAL: No fever, weight loss, or fatigue  EYES: No eye pain, visual disturbances, or discharge  ENMT:  No difficulty hearing, tinnitus, vertigo; No sinus or throat pain  NECK: No pain or stiffness  RESPIRATORY: No cough, wheezing, chills or hemoptysis; No shortness of breath  CARDIOVASCULAR: No chest pain, palpitations, dizziness, or leg swelling  GASTROINTESTINAL: No abdominal or epigastric pain. No nausea, vomiting, or hematemesis; No diarrhea or constipation. No melena or hematochezia.  NEUROLOGICAL: No headaches, memory loss, loss of strength, numbness, or tremors  SKIN: No itching, burning, rashes, or lesions   LYMPH NODES: No enlarged glands        PHYSICAL EXAM:    GENERAL: NAD, well-groomed, well-developed  HEAD:  Atraumatic, Normocephalic  EYES: EOMI, PERRLA, conjunctiva and sclera clear  ENMT: No tonsillar erythema, exudates, or enlargement; Moist mucous membranes, Good dentition, No lesions  NECK: Supple, No JVD, Normal thyroid  NERVOUS SYSTEM:  Alert & Oriented X3, Good concentration; Motor Strength 5/5 B/L upper and lower extremities; DTRs 2+ intact and symmetric  CHEST/LUNG: Clear to percussion bilaterally; No rales, rhonchi, wheezing, or rubs  HEART: Regular rate 3/6 murmur   ABDOMEN: Soft, Nontender, Nondistended; Bowel sounds present  EXTREMITIES:  2+ Peripheral Pulses, No clubbing, cyanosis, or edema        LABS:                        10.1   6.9   )-----------( 219      ( 05 Mar 2017 09:57 )             30.5     CBC Full  -  ( 05 Mar 2017 09:57 )  WBC Count : 6.9 K/uL  Hemoglobin : 10.1 g/dL  Hematocrit : 30.5 %  Platelet Count - Automated : 219 K/uL  Mean Cell Volume : 86.6 fl  Mean Cell Hemoglobin : 28.7 pg  Mean Cell Hemoglobin Concentration : 33.1 g/dL  Auto Neutrophil # : x  Auto Lymphocyte # : x  Auto Monocyte # : x  Auto Eosinophil # : x  Auto Basophil # : x  Auto Neutrophil % : x  Auto Lymphocyte % : x  Auto Monocyte % : x  Auto Eosinophil % : x  Auto Basophil % : x    05 Mar 2017 09:57    127    |  89     |  12.0   ----------------------------<  114    4.7     |  17.0   |  0.59     Ca    8.7        05 Mar 2017 09:57    TPro  7.1    /  Alb  2.2    /  TBili  0.3    /  DBili  x      /  AST  9      /  ALT  7      /  AlkPhos  69     04 Mar 2017 05:35          Culture Results:   No growth at 48 hours (03-02 @ 11:41)  Culture Results:   No growth at 48 hours (03-02 @ 11:40)    Procalcitonin, Serum: <0.23 ng/mL (03-05 @ 12:31)        LIVER FUNCTIONS - ( 04 Mar 2017 05:35 )  Alb: 2.2 g/dL / Pro: 7.1 g/dL / ALK PHOS: 69 U/L / ALT: 7 U/L / AST: 9 U/L / GGT: x             RADIOLOGY & ADDITIONAL TESTS:
Patient is a 76y old  Female who presents with a chief complaint of chest pain (02 Mar 2017 16:59)  pt no complaints today       CARDIOVASCULAR:  nitroglycerin     SubLingual 0.3milliGRAM(s) SubLingual every 5 minutes PRN  digoxin     Tablet 0.125milliGRAM(s) Oral daily  isosorbide   mononitrate ER Tablet (IMDUR) 30milliGRAM(s) Oral daily  carvedilol 3.125milliGRAM(s) Oral every 12 hours    PULMONARY:  buDESOnide 160 MICROgram(s)/formoterol 4.5 MICROgram(s) Inhaler 1Puff(s) Inhalation two times a day    NEUROLOGIC:  acetaminophen   Tablet. 650milliGRAM(s) Oral every 6 hours PRN  traMADol 50milliGRAM(s) Oral two times a day PRN  gabapentin 100milliGRAM(s) Oral two times a day  zolpidem 5milliGRAM(s) Oral at bedtime PRN      HEMATOLOGIC:  aspirin enteric coated 81milliGRAM(s) Oral daily    GATROINTESTINAL:  magnesium hydroxide Suspension 30milliLiter(s) Oral daily PRN  docusate sodium 100milliGRAM(s) Oral daily      ENDO/METABOLIC:  atorvastatin 10milliGRAM(s) Oral at bedtime  levothyroxine 112MICROGram(s) Oral daily      Allergies    adhesives (Unknown)  Codeine Phosphate (Other)  ferrous sulfate (Other)  penicillin (Other)      Vital Signs Last 24 Hrs  T(C): 36.9, Max: 36.9 (03-08 @ 06:10)  T(F): 98.5, Max: 98.5 (03-08 @ 06:10)  HR: 66 (63 - 75)  BP: 146/70 (70/50 - 146/70)  BP(mean): --  RR: 16 (15 - 16)  SpO2: 92% (92% - 98%)    REVIEW OF SYSTEMS:    CONSTITUTIONAL: No fever, weight loss, or fatigue  EYES: No eye pain, visual disturbances, or discharge  ENMT:  No difficulty hearing, tinnitus, vertigo; No sinus or throat pain  NECK: No pain or stiffness  RESPIRATORY: No cough, wheezing, chills or hemoptysis; No shortness of breath  CARDIOVASCULAR: No chest pain, palpitations, dizziness, or leg swelling  GASTROINTESTINAL: No abdominal or epigastric pain. No nausea, vomiting, or hematemesis; No diarrhea or constipation. No melena or hematochezia.  GENITOURINARY: No dysuria, frequency, hematuria, or incontinence  NEUROLOGICAL: No headaches, memory loss, loss of strength, numbness, or tremors  SKIN: No itching, burning, rashes, or lesions   LYMPH NODES: No enlarged glands            PHYSICAL EXAM:    GENERAL: NAD, well-groomed, well-developed  HEAD:  Atraumatic, Normocephalic  EYES: EOMI, PERRLA, conjunctiva and sclera clear  ENMT: No tonsillar erythema, exudates, or enlargement; Moist mucous membranes, Good dentition, No lesions  NECK: Supple, No JVD, Normal thyroid  NERVOUS SYSTEM:  Alert & Oriented X3,   CHEST/LUNG: Clear to percussion bilaterally; No rales, rhonchi, wheezing, or rubs  HEART: Regular rate and rhythm; No murmurs, rubs, or gallops  ABDOMEN: Soft, Nontender, Nondistended; Bowel sounds present  EXTREMITIES:  2+ Peripheral Pulses, No clubbing, cyanosis, or edema        LABS:    Culture Results:   No growth at 5 days. (03-02 @ 11:41)  Culture Results:   No growth at 5 days. (03-02 @ 11:40)    Procalcitonin, Serum: <0.23 ng/mL (03-05 @ 12:31)            RADIOLOGY & ADDITIONAL TESTS:
SUBJECTIVE: cardiology NP note.  Called to see pt for episode of transient hyptension while OOB. Pt assisted OOB by PT.. was sitting in chair and became dizzy BP 68/40 .. assisted back to bed and be came up to 104/60.  pt denies cp/sob or palpatations   	  MEDICATIONS:  nitroglycerin     SubLingual 0.3milliGRAM(s) SubLingual every 5 minutes PRN  digoxin     Tablet 0.125milliGRAM(s) Oral daily  carvedilol 6.25milliGRAM(s) Oral every 12 hours  amLODIPine   Tablet 5milliGRAM(s) Oral daily  isosorbide   mononitrate ER Tablet (IMDUR) 30milliGRAM(s) Oral daily  furosemide    Tablet 40milliGRAM(s) Oral daily    levoFLOXacin  Tablet 750milliGRAM(s) Oral every 24 hours    buDESOnide 160 MICROgram(s)/formoterol 4.5 MICROgram(s) Inhaler 1Puff(s) Inhalation two times a day    acetaminophen   Tablet. 650milliGRAM(s) Oral every 6 hours PRN  traMADol 50milliGRAM(s) Oral two times a day PRN  gabapentin 100milliGRAM(s) Oral two times a day  zolpidem 5milliGRAM(s) Oral at bedtime PRN    magnesium hydroxide Suspension 30milliLiter(s) Oral daily PRN  docusate sodium 100milliGRAM(s) Oral daily    atorvastatin 10milliGRAM(s) Oral at bedtime  levothyroxine 112MICROGram(s) Oral daily    aspirin enteric coated 81milliGRAM(s) Oral daily  potassium chloride    Tablet ER 10milliEquivalent(s) Oral daily        PHYSICAL EXAM:    T(C): 36.7, Max: 36.7 (-05 @ 21:26)  HR: 72 (65 - 75)  BP: 100/62 (68/40 - 128/68)  RR: 17 (16 - 18)  SpO2: 98% (94% - 100%)  Wt(kg): --    I&O's Summary    I & Os for current day (as of 06 Mar 2017 11:23)  =============================================  IN: 360 ml / OUT: 0 ml / NET: 360 ml      Daily     Daily Weight in k.2 (06 Mar 2017 05:32)    Appearance: pale 	  HEENT:   Dry..  oral mucosa, 	  Lymphatic: No lymphadenopathy  Cardiovascular: Normal S1 S2,RRR @ 60's No JVD, No murmurs, No edema  Respiratory: Lungs clear to auscultation	  Psychiatry: A & O x 3, Mood & affect appropriate  Gastrointestinal:  Soft, Non-tender, + BS	  Skin: No rashes, No ecchymoses, No cyanosis  Neurologic: Non-focal  Extremities: Normal range of motion, No clubbing, cyanosis or edema  Vascular: Peripheral pulses palpable 2+ bilaterally    TELEMETRY: 	Reviewed no events RSR 60's     ECG:  	  RADIOLOGY:   DIAGNOSTIC TESTING:  [ ] Echocardiogram:  [ ]  Catheterization:  [ ] Stress Test:    OTHER: 	    LABS:	 	    CARDIAC MARKERS:                                  10.1   6.9   )-----------( 219      ( 05 Mar 2017 09:57 )             30.5     05 Mar 2017 09:57    127    |  89     |  12.0   ----------------------------<  114    4.7     |  17.0   |  0.59     Ca    8.7        05 Mar 2017 09:57      proBNP:   Lipid Profile:   HgA1c:   TSH:     ASSESSMENT:   Patient is a 76y old  Female who presents with a chief complaint of chest pain Being RX for Pneumonia.   PMH:  Abdominal aortic aneurysm    CHF (congestive heart failure)    COPD (chronic obstructive pulmonary disease)    Coronary artery disease involving native coronary artery of native heart without angina pectoris    DVT (deep venous thrombosis)    GERD (gastroesophageal reflux disease)    Hearing loss    Hyperlipidemia    Hypertension    Hypotension    Hypothyroidism, unspecified type    Orthostatic hypotension    Pleural effusion    Rheumatoid arthritis    Spinal stenosis    Syncope and collapse    Urine retention.  - episode of hypotension/ Pt received several BP meds at 6am   -episode resolved when back in bed.   Plan :  continue to monitor BP and orthostatics  Primary RN aware.
[facility]                                   [address] [place] [address]                                                      PHONE: [phone]                                                         FAX: [phone]  [email or web address]  ---------------------------------------------------------------------------------------------------------------------------------    Overnight events/patient complaints:  -pt had episode of hypotension earlier  -she complains of mild left sided 1/10 left epigastric       PAST MEDICAL & SURGICAL HISTORY:  DVT (deep venous thrombosis)  Rheumatoid arthritis  COPD (chronic obstructive pulmonary disease)  Hyperlipidemia  Hypertension  Spinal stenosis  Hearing loss  Urine retention  GERD (gastroesophageal reflux disease)  Orthostatic hypotension  Abdominal aortic aneurysm  Pleural effusion  Syncope and collapse  Hypothyroidism, unspecified type  Coronary artery disease involving native coronary artery of native heart without angina pectoris  CHF (congestive heart failure)  Hypotension  S/P AVR  S/P CABG x 1  S/P AAA repair  No significant past surgical history      adhesives (Unknown)  Codeine Phosphate (Other)  ferrous sulfate (Other)  penicillin (Other)    MEDICATIONS  (STANDING):  aspirin enteric coated 81milliGRAM(s) Oral daily  digoxin     Tablet 0.125milliGRAM(s) Oral daily  gabapentin 100milliGRAM(s) Oral two times a day  atorvastatin 10milliGRAM(s) Oral at bedtime  buDESOnide 160 MICROgram(s)/formoterol 4.5 MICROgram(s) Inhaler 1Puff(s) Inhalation two times a day  docusate sodium 100milliGRAM(s) Oral daily  levothyroxine 112MICROGram(s) Oral daily  lactobacillus acidophilus 1Tablet(s) Oral daily  amLODIPine   Tablet 5milliGRAM(s) Oral daily  isosorbide   mononitrate ER Tablet (IMDUR) 30milliGRAM(s) Oral daily  levoFLOXacin  Tablet 750milliGRAM(s) Oral every 24 hours  sodium chloride 0.9% Bolus 500milliLiter(s) IV Bolus once    MEDICATIONS  (PRN):  acetaminophen   Tablet. 650milliGRAM(s) Oral every 6 hours PRN Mild Pain (1 - 3)  traMADol 50milliGRAM(s) Oral two times a day PRN Moderate Pain (4 - 6)  magnesium hydroxide Suspension 30milliLiter(s) Oral daily PRN Constipation  nitroglycerin     SubLingual 0.3milliGRAM(s) SubLingual every 5 minutes PRN Chest Pain  zolpidem 5milliGRAM(s) Oral at bedtime PRN Insomnia      Vital Signs Last 24 Hrs  T(C): 36.9, Max: 36.9 (03-06 @ 16:50)  T(F): 98.4, Max: 98.4 (03-06 @ 16:50)  HR: 72 (70 - 75)  BP: 112/65 (68/40 - 128/62)  BP(mean): --  RR: 18 (16 - 18)  SpO2: 98% (94% - 100%)  ICU Vital Signs Last 24 Hrs  MAYNOR CHANEY  I&O's Detail  I & Os for 24h ending 06 Mar 2017 07:00  =============================================  IN:    Oral Fluid: 360 ml    Total IN: 360 ml  ---------------------------------------------  OUT:    Total OUT: 0 ml  ---------------------------------------------  Total NET: 360 ml    I & Os for current day (as of 06 Mar 2017 18:25)  =============================================  IN:    Oral Fluid: 600 ml    Total IN: 600 ml  ---------------------------------------------  OUT:    Voided: 700 ml    Total OUT: 700 ml  ---------------------------------------------  Total NET: -100 ml    I&O's Summary  I & Os for 24h ending 06 Mar 2017 07:00  =============================================  IN: 360 ml / OUT: 0 ml / NET: 360 ml    I & Os for current day (as of 06 Mar 2017 18:25)  =============================================  IN: 600 ml / OUT: 700 ml / NET: -100 ml    Drug Dosing Weight  MAYNOR CHANEY      PHYSICAL EXAM:  General: Appears well developed, well nourished alert and cooperative.  HEENT: Head; normocephalic, atraumatic.  Eyes: Pupils reactive, cornea wnl.  Neck: Supple, no nodes adenopathy, no NVD or carotid bruit or thyromegaly.  CARDIOVASCULAR: Normal S1 and S2, No murmur, rub, gallop or lift.   LUNGS: No rales, rhonchi or wheeze. Normal breath sounds bilaterally.  ABDOMEN: Soft, nontender without mass or organomegaly. bowel sounds normoactive.  EXTREMITIES: No clubbing, cyanosis or edema. Distal pulses wnl.   SKIN: warm and dry with normal turgor.  NEURO: Alert/oriented x 3/normal motor exam. No pathologic reflexes.    PSYCH: normal affect.        LABS:                        10.1   6.9   )-----------( 219      ( 05 Mar 2017 09:57 )             30.5     05 Mar 2017 09:57    127    |  89     |  12.0   ----------------------------<  114    4.7     |  17.0   |  0.59     Ca    8.7        05 Mar 2017 09:57      MAYNOR CHANEY          ASSESSMENT AND PLAN:  76 year old woman with a past medical history of A fib s/p TAVR, AAA repair, CAD s/p CABGx 1, ICMP/CHF (EF 40-45%)  recently discharged from [facility] 9 days ago where aortic dissection was discovered as inpatient who presents to the ED from NH for chest pain and sob. CP recurrent pressure like sensation.    Hypotension:  -etiology - likely hypovolemic   -pt's coreg /lasix held  -pt receiving Fluid bolus  -pt currently feeling better- eating dinner  -continue tele   -going forward will hold lasix to be used prn    Thank you for allowing [facility] to participate in the care of this patient.  Please feel free to call with any questions or concerns.

## 2018-01-01 NOTE — DISCHARGE NOTE ADULT - FUNCTIONAL STATUS DATE
16-Feb-2017 Anus position normal and patency confirmed, rectal-cutaneous fistula absent, normal anal wink. 20-Feb-2017

## 2018-11-21 NOTE — DIETITIAN INITIAL EVALUATION ADULT. - NS FNS CHANGE IN WEIGHT
Recorded as Task  Date: 11/21/2017 04:58 PM, Created By: SONIYA COSME  Task Name: Nurse Order  Assigned To: SONIYA COSME  Regarding Patient: LEYLA BAEZ, Status: Active  Comment:   SONIYA COSME - 21 Nov 2017 4:58 PM    TASK CREATED  Patient needs clearance form for surgery (carotid endarterectomy) to be done next Monday.  Start the form and fax to my house at [phone] and I will sign and send back.  Monserrat Garber - 24 Nov 2017 8:38 AM    TASK REPLIED TO: Previously Assigned To Monserrat Garber  Pre-op form filled out and faxed to your home. Please fax back to Cardiology RN room fax at [phone].  Monserrat Garber - 24 Nov 2017 12:52 PM    TASK EDITED  Pre-op clearance form filled out and signed by Dr Cosme.  Form faxed to Dr Hernandez at [facility] at [phone].  Fax confirmation received.      Electronically signed by:Monserrat Garber R.N.  Nov 24 2017 12:52PM CST     Loss

## 2020-10-01 NOTE — H&P ADULT. - EKG AND INTERPRETATION
Face to Face Supporting Documentation - Home Health    The encounter with this patient was in whole or in part the primary reason for home health admission.    Date of encounter:   Patient:                    MRN:                       YOB: 2021  Mikayla Knight  [id number]  1962     Home health to see patient for:  Wound Care    Skilled need for:  New Onset Medical Diagnosis Abdominal abscess    Skilled nursing interventions to include:  Wound Care    Homebound status evidenced by:  Needs the assistance of another person in order to leave the home. Leaving home requires a considerable and taxing effort. There is a normal inability to leave the home.    Community Physician to provide follow up care: YOSEPH Welch     Optional Interventions? Yes, Details: Requires wound care about 3 times/week( Monday/Wednesday,Friday)      I certify the face to face encounter for this home health care referral meets the CMS requirements and the encounter/clinical assessment with the patient was, in whole, or in part, for the medical condition(s) listed above, which is the primary reason for home health care. Based on my clinical findings: the service(s) are medically necessary, support the need for home health care, and the homebound criteria are met.  I certify that this patient has had a face to face encounter by myself.  Delmis Nieto M.D. - NPI: [id number]     EKG visualized, NSR, nonspecific TW abnormalities

## 2021-06-20 NOTE — PROGRESS NOTE ADULT - PROVIDER SPECIALTY LIST ADULT
Cardiology
Family Medicine
Infectious Disease
Patient ambulated 30 feet in dacosta with assist of one, gait belt, & wheeled walker.  C/o weakness in legs & knees.    Problem: Activity/Exercise Intolerance  Goal: Patient will tolerate activity/exercise  Intervention: Progress activity per Cardiac Rehab protocol  Note: Intervention Status  Done  Intervention: Progressive graded ambulation  Note: Intervention Status  Done  Intervention: Collaborate with nursing to ensure ambulation 4-6 times per day  Note: Intervention Status  Done  Intervention: Monitor and document progressive activity response  Note: Intervention Status  Done  Intervention: Encourage hourly incentive spirometry, cough and deep breathe  Note: Intervention Status  Done     
Family Medicine

## 2021-09-14 NOTE — PATIENT PROFILE ADULT. - TEACHING/LEARNING DEVELOPMENTAL CONSIDERATIONS
Adherence/Allergies/Herbals/Interactions/Medication precautions/Miss dose instruction/Purpose/Side effects/Signs and symptoms to report/Storage and handling
Adherence/Allergies/Herbals/Interactions/Medication precautions/Miss dose instruction/Purpose/Side effects/Signs and symptoms to report/Storage and handling
none

## 2024-03-11 NOTE — ED PROVIDER NOTE - CPE EDP CARDIAC NORM
normal...

## 2024-10-09 NOTE — PATIENT PROFILE ADULT. - HEALTHCARE QUESTIONS, PROFILE
Pt began calling out appearing anxious, rn attempted to redirect pt, charge nurse notified provider, meds administered per mar, pt became calm.   none at this time

## 2025-02-05 NOTE — ED ADULT NURSE REASSESSMENT NOTE - CHEST APPEARANCE
Problem: Nutrition/Hydration-ADULT  Goal: Nutrient/Hydration intake appropriate for improving, restoring or maintaining nutritional needs  Description: Monitor and assess patient's nutrition/hydration status for malnutrition. Collaborate with interdisciplinary team and initiate plan and interventions as ordered.  Monitor patient's weight and dietary intake as ordered or per policy. Utilize nutrition screening tool and intervene as necessary. Determine patient's food preferences and provide high-protein, high-caloric foods as appropriate.     INTERVENTIONS:  - Monitor oral intake, urinary output, labs, and treatment plans  - Assess nutrition and hydration status and recommend course of action  - Evaluate amount of meals eaten  - Assist patient with eating if necessary   - Allow adequate time for meals  - Recommend/ encourage appropriate diets, oral nutritional supplements, and vitamin/mineral supplements  - Order, calculate, and assess calorie counts as needed  - Recommend, monitor, and adjust tube feedings and TPN/PPN based on assessed needs  - Assess need for intravenous fluids  - Provide specific nutrition/hydration education as appropriate  - Include patient/family/caregiver in decisions related to nutrition  Outcome: Progressing     Problem: PAIN - ADULT  Goal: Verbalizes/displays adequate comfort level or baseline comfort level  Description: Interventions:  - Encourage patient to monitor pain and request assistance  - Assess pain using appropriate pain scale  - Administer analgesics based on type and severity of pain and evaluate response  - Implement non-pharmacological measures as appropriate and evaluate response  - Consider cultural and social influences on pain and pain management  - Notify physician/advanced practitioner if interventions unsuccessful or patient reports new pain  Outcome: Progressing     Problem: INFECTION - ADULT  Goal: Absence or prevention of progression during  hospitalization  Description: INTERVENTIONS:  - Assess and monitor for signs and symptoms of infection  - Monitor lab/diagnostic results  - Monitor all insertion sites, i.e. indwelling lines, tubes, and drains  - Monitor endotracheal if appropriate and nasal secretions for changes in amount and color  - [place] appropriate cooling/warming therapies per order  - Administer medications as ordered  - Instruct and encourage patient and family to use good hand hygiene technique  - Identify and instruct in appropriate isolation precautions for identified infection/condition  Outcome: Progressing  Goal: Absence of fever/infection during neutropenic period  Description: INTERVENTIONS:  - Monitor WBC    Outcome: Progressing     Problem: SAFETY ADULT  Goal: Patient will remain free of falls  Description: INTERVENTIONS:  - Educate patient/family on patient safety including physical limitations  - Instruct patient to call for assistance with activity   - Consult OT/PT to assist with strengthening/mobility   - Keep Call bell within reach  - Keep bed low and locked with side rails adjusted as appropriate  - Keep care items and personal belongings within reach  - Initiate and maintain comfort rounds  - Make Fall Risk Sign visible to staff  - Offer Toileting every  Hours, in advance of need  - Initiate/Maintain alarm  - Obtain necessary fall risk management equipment:   - Apply yellow socks and bracelet for high fall risk patients  - Consider moving patient to room near nurses station  Outcome: Progressing  Goal: Maintain or return to baseline ADL function  Description: INTERVENTIONS:  -  Assess patient's ability to carry out ADLs; assess patient's baseline for ADL function and identify physical deficits which impact ability to perform ADLs (bathing, care of mouth/teeth, toileting, grooming, dressing, etc.)  - Assess/evaluate cause of self-care deficits   - Assess range of motion  - Assess patient's mobility; develop plan if  impaired  - Assess patient's need for assistive devices and provide as appropriate  - Encourage maximum independence but intervene and supervise when necessary  - Involve family in performance of ADLs  - Assess for home care needs following discharge   - Consider OT consult to assist with ADL evaluation and planning for discharge  - Provide patient education as appropriate  Outcome: Progressing  Goal: Maintains/Returns to pre admission functional level  Description: INTERVENTIONS:  - Perform AM-PAC 6 Click Basic Mobility/ Daily Activity assessment daily.  - Set and communicate daily mobility goal to care team and patient/family/caregiver.   - Collaborate with rehabilitation services on mobility goals if consulted  - Perform Range of Motion  times a day.  - Reposition patient every  hours.  - Dangle patient  times a day  - Stand patient  times a day  - Ambulate patient  times a day  - Out of bed to chair  times a day   - Out of bed for meals  times a day  - Out of bed for toileting  - Record patient progress and toleration of activity level   Outcome: Progressing     Problem: DISCHARGE PLANNING  Goal: Discharge to home or other facility with appropriate resources  Description: INTERVENTIONS:  - Identify barriers to discharge w/patient and caregiver  - Arrange for needed discharge resources and transportation as appropriate  - Identify discharge learning needs (meds, wound care, etc.)  - Arrange for interpretive services to assist at discharge as needed  - Refer to Case Management Department for coordinating discharge planning if the patient needs post-hospital services based on physician/advanced practitioner order or complex needs related to functional status, cognitive ability, or social support system  Outcome: Progressing     Problem: Knowledge Deficit  Goal: Patient/family/caregiver demonstrates understanding of disease process, treatment plan, medications, and discharge instructions  Description: Complete  learning assessment and assess knowledge base.  Interventions:  - Provide teaching at level of understanding  - Provide teaching via preferred learning methods  Outcome: Progressing      normal